# Patient Record
Sex: FEMALE | Race: WHITE | NOT HISPANIC OR LATINO | Employment: UNEMPLOYED | URBAN - METROPOLITAN AREA
[De-identification: names, ages, dates, MRNs, and addresses within clinical notes are randomized per-mention and may not be internally consistent; named-entity substitution may affect disease eponyms.]

---

## 2023-04-04 ENCOUNTER — TELEPHONE (OUTPATIENT)
Dept: OBSTETRICS AND GYNECOLOGY | Facility: CLINIC | Age: 33
End: 2023-04-04
Payer: MEDICAID

## 2023-04-04 DIAGNOSIS — R11.2 NAUSEA AND VOMITING, UNSPECIFIED VOMITING TYPE: Primary | ICD-10-CM

## 2023-04-04 RX ORDER — DIPHENHYDRAMINE HYDROCHLORIDE 25 MG/1
CAPSULE ORAL
Qty: 90 TABLET | Refills: 4 | Status: SHIPPED | OUTPATIENT
Start: 2023-04-04

## 2023-04-04 NOTE — TELEPHONE ENCOUNTER
Patient called this pm.  She is requesting something for nausea.  Patient has appointment on 6/19/23 new ob with Samuel Loja.  Per protocol sent Pyridoxine & Doxylamine.  Called patient left message Rx @ pharmacy.

## 2023-06-15 ENCOUNTER — INITIAL PRENATAL (OUTPATIENT)
Dept: OBSTETRICS AND GYNECOLOGY | Facility: CLINIC | Age: 33
End: 2023-06-15
Payer: MEDICAID

## 2023-06-15 VITALS — SYSTOLIC BLOOD PRESSURE: 131 MMHG | BODY MASS INDEX: 38.17 KG/M2 | DIASTOLIC BLOOD PRESSURE: 87 MMHG | WEIGHT: 202 LBS

## 2023-06-15 DIAGNOSIS — Z34.80 SUPERVISION OF OTHER NORMAL PREGNANCY, ANTEPARTUM: Primary | ICD-10-CM

## 2023-06-15 DIAGNOSIS — O09.30 LATE PRENATAL CARE AFFECTING PREGNANCY, ANTEPARTUM: ICD-10-CM

## 2023-06-15 LAB
ABO GROUP BLD: NORMAL
AMPHET+METHAMPHET UR QL: NEGATIVE
B-HCG UR QL: POSITIVE
BARBITURATES UR QL SCN: NEGATIVE
BASOPHILS # BLD AUTO: 0.03 10*3/MM3 (ref 0–0.2)
BASOPHILS NFR BLD AUTO: 0.2 % (ref 0–1.5)
BENZODIAZ UR QL SCN: NEGATIVE
BLD GP AB SCN SERPL QL: NEGATIVE
CANNABINOIDS SERPL QL: POSITIVE
COCAINE UR QL: NEGATIVE
DEPRECATED RDW RBC AUTO: 41.9 FL (ref 37–54)
EOSINOPHIL # BLD AUTO: 0.09 10*3/MM3 (ref 0–0.4)
EOSINOPHIL NFR BLD AUTO: 0.7 % (ref 0.3–6.2)
ERYTHROCYTE [DISTWIDTH] IN BLOOD BY AUTOMATED COUNT: 13 % (ref 12.3–15.4)
EXPIRATION DATE: ABNORMAL
FENTANYL UR-MCNC: NEGATIVE NG/ML
GLUCOSE UR STRIP-MCNC: NEGATIVE MG/DL
HCT VFR BLD AUTO: 36.3 % (ref 34–46.6)
HCV AB SER DONR QL: REACTIVE
HGB BLD-MCNC: 12.4 G/DL (ref 12–15.9)
HIV1+2 AB SER QL: NORMAL
IMM GRANULOCYTES # BLD AUTO: 0.08 10*3/MM3 (ref 0–0.05)
IMM GRANULOCYTES NFR BLD AUTO: 0.6 % (ref 0–0.5)
INTERNAL NEGATIVE CONTROL: NEGATIVE
INTERNAL POSITIVE CONTROL: ABNORMAL
LYMPHOCYTES # BLD AUTO: 1.88 10*3/MM3 (ref 0.7–3.1)
LYMPHOCYTES NFR BLD AUTO: 14.9 % (ref 19.6–45.3)
Lab: ABNORMAL
MCH RBC QN AUTO: 30.2 PG (ref 26.6–33)
MCHC RBC AUTO-ENTMCNC: 34.2 G/DL (ref 31.5–35.7)
MCV RBC AUTO: 88.3 FL (ref 79–97)
METHADONE UR QL SCN: NEGATIVE
MONOCYTES # BLD AUTO: 0.57 10*3/MM3 (ref 0.1–0.9)
MONOCYTES NFR BLD AUTO: 4.5 % (ref 5–12)
NEUTROPHILS NFR BLD AUTO: 79.1 % (ref 42.7–76)
NEUTROPHILS NFR BLD AUTO: 9.99 10*3/MM3 (ref 1.7–7)
NRBC BLD AUTO-RTO: 0 /100 WBC (ref 0–0.2)
OPIATES UR QL: NEGATIVE
OXYCODONE UR QL SCN: NEGATIVE
PLATELET # BLD AUTO: 272 10*3/MM3 (ref 140–450)
PMV BLD AUTO: 10.3 FL (ref 6–12)
PROT UR STRIP-MCNC: NEGATIVE MG/DL
RBC # BLD AUTO: 4.11 10*6/MM3 (ref 3.77–5.28)
RH BLD: NEGATIVE
T PALLIDUM IGG SER QL: NORMAL
WBC NRBC COR # BLD: 12.64 10*3/MM3 (ref 3.4–10.8)

## 2023-06-15 PROCEDURE — 80307 DRUG TEST PRSMV CHEM ANLYZR: CPT | Performed by: NURSE PRACTITIONER

## 2023-06-15 PROCEDURE — 86850 RBC ANTIBODY SCREEN: CPT | Performed by: NURSE PRACTITIONER

## 2023-06-15 PROCEDURE — 86900 BLOOD TYPING SEROLOGIC ABO: CPT | Performed by: NURSE PRACTITIONER

## 2023-06-15 PROCEDURE — 87086 URINE CULTURE/COLONY COUNT: CPT | Performed by: NURSE PRACTITIONER

## 2023-06-15 PROCEDURE — G0432 EIA HIV-1/HIV-2 SCREEN: HCPCS | Performed by: NURSE PRACTITIONER

## 2023-06-15 PROCEDURE — 86901 BLOOD TYPING SEROLOGIC RH(D): CPT | Performed by: NURSE PRACTITIONER

## 2023-06-15 PROCEDURE — 85025 COMPLETE CBC W/AUTO DIFF WBC: CPT | Performed by: NURSE PRACTITIONER

## 2023-06-15 PROCEDURE — 87340 HEPATITIS B SURFACE AG IA: CPT | Performed by: NURSE PRACTITIONER

## 2023-06-15 PROCEDURE — 86780 TREPONEMA PALLIDUM: CPT | Performed by: NURSE PRACTITIONER

## 2023-06-15 PROCEDURE — 86803 HEPATITIS C AB TEST: CPT | Performed by: NURSE PRACTITIONER

## 2023-06-15 PROCEDURE — 80053 COMPREHEN METABOLIC PANEL: CPT | Performed by: NURSE PRACTITIONER

## 2023-06-15 RX ORDER — MECLIZINE HYDROCHLORIDE 25 MG/1
25 TABLET ORAL DAILY
COMMUNITY

## 2023-06-15 NOTE — PROGRESS NOTES
OB Initial Visit    CC- Here for care of current pregnancy, first visit    Subjective:  32 y.o.  presenting for her first obstetrical visit.    LMP: No LMP recorded (lmp unknown). Patient is pregnant.     Pt has no complaints, is doing well    Patient is upset she is not having an ultrasound today, explained will need to schedule anatomy ultrasound.     Reviewed and updated:  OBHx, GYNHx (STDs), PMHx, Medications, Allergies, PSHx, Social Hx, Preventative Hx (PAP), Hx of abuse/safe environment, Vaccine Hx including hx of chickenpox or vaccine, Genetic Hx (pt, FOB, both families).        Objective:  /87   Wt 91.6 kg (202 lb)   LMP  (LMP Unknown)   BMI 38.17 kg/m²      Urine pregnancy test is positive     General- NAD, alert and oriented, appropriate, constricted pupils noted  Psych- Normal mood, good memory  Neck- No masses, no thyroid enlargement  CV- Regular rhythm, no murnurs  Resp- CTA to bases, no wheezes  Abdomen- Soft, non distended, non tender, no masses    Breast left- deferred  Breast right- deferred    Uterus- Normal shape and consistency, non tender, Size greater than dates,  .    Patient refused pelvic exam    Lymphatic- No palpable neck, axillary, or groin nodes  Ext- No edema, no cyanosis    Skin- No lesions, no rashes, no acanthosis nigricans    Assessment and Plan:  Unknown  Diagnoses and all orders for this visit:    1. Supervision of other normal pregnancy, antepartum (Primary)  Overview:  GLORY finalized: Unsure LMP, believes she conceived on Gan's day    Genetic testing (NIPS-Quad)/CF/AFP:  Discussed all, declines    COVID: Fully vaccinated  Flu:  Tdap:    Rhogam:  ? Desires Sterilization:    Anatomy US:  FU US:    PROBLEM LIST/PLAN:   Late care - UDS        Orders:  -     POC Urinalysis Dipstick  -     Cancel: IGP,CtNgTv,Apt HPV,rfx 16 / 18,45  -     OB Panel With HIV  -     Urine Culture - Urine, Urine, Random Void  -     Hemoglobinopathy Fractionation Cascade  -      POC Pregnancy, Urine  -     US Ob Detail Fetal Anatomy Single or First Gestation; Future  -     Cancel: Chlamydia trachomatis, Neisseria gonorrhoeae, Trichomonas vaginalis, PCR - Swab, Cervix  -     Chlamydia trachomatis, Neisseria gonorrhoeae, Trichomonas vaginalis, PCR - Urine, Urine, Clean Catch    2. Late prenatal care affecting pregnancy, antepartum  Overview:  UDS    Orders:  -     Urine Drug Screen - Urine, Clean Catch        Genetic Screening:   Counseled.  Declines all.     Vaccines:   s/p COVID vaccine    Counseling:   Nutrition discussed, calories, activity/exercise in pregnancy  Discussed dietary restrictions/safety food preparation in pregnancy  Reviewed what to expect prenatal visits, office providers and formerly [facility] hospitalists  Appropriate trimester precautions provided, N/V, vag bleeding, cramping  Questions answered    Labs:   Prenatal labs, cultures, and PAP performed (prn)    Return in about 5 weeks (around 7/20/2023) for [facility] Office, OB follow up, 1hGTT.      Samuel Garner, APRN  06/15/2023    [facility] [place] [address]  [facility]  [address] [place] [address]  Dept: [phone]  Loc: [phone]

## 2023-06-16 DIAGNOSIS — B18.2 CHRONIC HEPATITIS C COMPLICATING PREGNANCY, ANTEPARTUM: Primary | ICD-10-CM

## 2023-06-16 DIAGNOSIS — O98.419 CHRONIC HEPATITIS C COMPLICATING PREGNANCY, ANTEPARTUM: Primary | ICD-10-CM

## 2023-06-16 LAB
ALBUMIN SERPL-MCNC: 3.6 G/DL (ref 3.5–5.2)
ALBUMIN/GLOB SERPL: 1.2 G/DL
ALP SERPL-CCNC: 55 U/L (ref 39–117)
ALT SERPL W P-5'-P-CCNC: 58 U/L (ref 1–33)
ANION GAP SERPL CALCULATED.3IONS-SCNC: 17 MMOL/L (ref 5–15)
AST SERPL-CCNC: 53 U/L (ref 1–32)
BACTERIA SPEC AEROBE CULT: NO GROWTH
BILIRUB SERPL-MCNC: 0.3 MG/DL (ref 0–1.2)
BUN SERPL-MCNC: 6 MG/DL (ref 6–20)
BUN/CREAT SERPL: 9 (ref 7–25)
CALCIUM SPEC-SCNC: 9.4 MG/DL (ref 8.6–10.5)
CHLORIDE SERPL-SCNC: 103 MMOL/L (ref 98–107)
CO2 SERPL-SCNC: 16 MMOL/L (ref 22–29)
CREAT SERPL-MCNC: 0.67 MG/DL (ref 0.57–1)
EGFRCR SERPLBLD CKD-EPI 2021: 120.8 ML/MIN/1.73
GLOBULIN UR ELPH-MCNC: 3.1 GM/DL
GLUCOSE SERPL-MCNC: 91 MG/DL (ref 65–99)
HBV SURFACE AG SERPL QL IA: NORMAL
HGB A MFR BLD ELPH: 97.2 % (ref 96.4–98.8)
HGB A2 MFR BLD ELPH: 2.8 % (ref 1.8–3.2)
HGB F MFR BLD ELPH: 0 % (ref 0–2)
HGB FRACT BLD-IMP: NORMAL
HGB S MFR BLD ELPH: 0 %
POTASSIUM SERPL-SCNC: 3.9 MMOL/L (ref 3.5–5.2)
PROT SERPL-MCNC: 6.7 G/DL (ref 6–8.5)
RUBV IGG SERPL IA-ACNC: 1.86 INDEX
SODIUM SERPL-SCNC: 136 MMOL/L (ref 136–145)

## 2023-06-17 LAB
C TRACH RRNA SPEC QL NAA+PROBE: NEGATIVE
N GONORRHOEA RRNA SPEC QL NAA+PROBE: NEGATIVE
T VAGINALIS RRNA SPEC QL NAA+PROBE: NEGATIVE

## 2023-07-18 PROBLEM — O26.899 RH NEGATIVE, ANTEPARTUM: Status: ACTIVE | Noted: 2023-07-18

## 2023-07-18 PROBLEM — Z67.91 RH NEGATIVE, ANTEPARTUM: Status: ACTIVE | Noted: 2023-07-18

## 2023-07-20 PROCEDURE — 85027 COMPLETE CBC AUTOMATED: CPT | Performed by: OBSTETRICS & GYNECOLOGY

## 2023-07-20 PROCEDURE — 82950 GLUCOSE TEST: CPT | Performed by: OBSTETRICS & GYNECOLOGY

## 2023-07-28 ENCOUNTER — HOSPITAL ENCOUNTER (OUTPATIENT)
Dept: INFUSION THERAPY | Facility: HOSPITAL | Age: 33
Discharge: HOME | End: 2023-07-28
Payer: MEDICAID

## 2023-07-28 VITALS
OXYGEN SATURATION: 99 % | HEART RATE: 117 BPM | DIASTOLIC BLOOD PRESSURE: 80 MMHG | TEMPERATURE: 96.2 F | SYSTOLIC BLOOD PRESSURE: 119 MMHG | RESPIRATION RATE: 20 BRPM

## 2023-07-28 DIAGNOSIS — Z67.91 RH NEGATIVE, ANTEPARTUM: Primary | ICD-10-CM

## 2023-07-28 DIAGNOSIS — O26.899 RH NEGATIVE, ANTEPARTUM: Primary | ICD-10-CM

## 2023-07-28 DIAGNOSIS — B18.2 CHRONIC HEPATITIS C COMPLICATING PREGNANCY, ANTEPARTUM: ICD-10-CM

## 2023-07-28 DIAGNOSIS — O98.419 CHRONIC HEPATITIS C COMPLICATING PREGNANCY, ANTEPARTUM: ICD-10-CM

## 2023-07-28 LAB
ABO GROUP BLD: NORMAL
RH BLD: NEGATIVE

## 2023-07-28 PROCEDURE — 86900 BLOOD TYPING SEROLOGIC ABO: CPT | Performed by: OBSTETRICS & GYNECOLOGY

## 2023-07-28 PROCEDURE — 25010000002 RHO D IMMUNE GLOBULIN 1500 UNIT/2ML SOLUTION PREFILLED SYRINGE: Performed by: OBSTETRICS & GYNECOLOGY

## 2023-07-28 PROCEDURE — 36415 COLL VENOUS BLD VENIPUNCTURE: CPT

## 2023-07-28 PROCEDURE — 86901 BLOOD TYPING SEROLOGIC RH(D): CPT | Performed by: OBSTETRICS & GYNECOLOGY

## 2023-07-28 PROCEDURE — 96372 THER/PROPH/DIAG INJ SC/IM: CPT

## 2023-07-28 PROCEDURE — 87522 HEPATITIS C REVRS TRNSCRPJ: CPT | Performed by: NURSE PRACTITIONER

## 2023-07-28 RX ORDER — MECLIZINE HYDROCHLORIDE 25 MG/1
25 TABLET ORAL 3 TIMES DAILY PRN
COMMUNITY

## 2023-07-28 RX ORDER — PRENATAL VIT NO.126/IRON/FOLIC 28MG-0.8MG
TABLET ORAL DAILY
COMMUNITY

## 2023-07-28 RX ADMIN — HUMAN RHO(D) IMMUNE GLOBULIN 1500 UNITS: 1500 SOLUTION INTRAMUSCULAR; INTRAVENOUS at 13:41

## 2023-07-28 NOTE — PROGRESS NOTES
1420-Patient observed 30 minutes post injection. No S/S reaction noted.  Patient D/C'd ambulatory with significant other.

## 2023-07-31 LAB
HCV RNA SERPL NAA+PROBE-ACNC: NORMAL IU/ML
HCV RNA SERPL NAA+PROBE-LOG IU: 6.23 LOG10 IU/ML
TEST INFORMATION: NORMAL

## 2023-08-15 ENCOUNTER — HOSPITAL ENCOUNTER (EMERGENCY)
Facility: HOSPITAL | Age: 33
Discharge: HOME | End: 2023-08-15
Attending: OBSTETRICS & GYNECOLOGY | Admitting: OBSTETRICS & GYNECOLOGY
Payer: MEDICAID

## 2023-08-15 VITALS
BODY MASS INDEX: 36.8 KG/M2 | DIASTOLIC BLOOD PRESSURE: 56 MMHG | WEIGHT: 200 LBS | TEMPERATURE: 99 F | HEIGHT: 62 IN | HEART RATE: 98 BPM | RESPIRATION RATE: 18 BRPM | OXYGEN SATURATION: 97 % | SYSTOLIC BLOOD PRESSURE: 130 MMHG

## 2023-08-15 LAB — A1 MICROGLOB PLACENTAL VAG QL: NEGATIVE

## 2023-08-15 PROCEDURE — 99284 EMERGENCY DEPT VISIT MOD MDM: CPT | Performed by: OBSTETRICS & GYNECOLOGY

## 2023-08-15 PROCEDURE — 59025 FETAL NON-STRESS TEST: CPT

## 2023-08-15 PROCEDURE — 84112 EVAL AMNIOTIC FLUID PROTEIN: CPT | Performed by: OBSTETRICS & GYNECOLOGY

## 2023-08-15 NOTE — OBED NOTES
NILSA Note OB        Patient Name: Hortencia Canada  YOB: 1990  MRN: [id number]  Admission Date: 8/15/2023  1:30 AM  Date of Service: 8/15/2023    Chief Complaint: Leaking Fluid (Pt came into theobed c/o of lof . Pt states that she was sitting on a toilet and straining to have a bm for several mins and then she felt fluid come out. + fm noted denies any vag bleeding denies any ctx )        Subjective     Hortencia Canada is a 33 y.o. female  at 29w4d with Estimated Date of Delivery: 10/27/23 who presents with the chief complaint listed above.  She sees Crystal Orellana MD for her prenatal care. Her pregnancy has been complicated by:   chronic hepatitis C, THC use, Rh negative .        She describes fetal movement as normal.  She admits to rupture of membranes.  She denies vaginal bleeding. She is not feeling contractions.          Objective   Patient Active Problem List    Diagnosis     Rh negative, antepartum [O26.899, Z67.91]     Chronic hepatitis C complicating pregnancy, antepartum [O98.419, B18.2]     Supervision of other normal pregnancy, antepartum [Z34.80]     Late prenatal care affecting pregnancy, antepartum [O09.30]         OB History    Para Term  AB Living   2 1 1 0 0 1   SAB IAB Ectopic Molar Multiple Live Births   0 0 0 0 0 1      # Outcome Date GA Lbr Juan Miguel/2nd Weight Sex Delivery Anes PTL Lv   2 Current            1 Term 17 41w1d  3317 g (7 lb 5 oz) M Vag-Spont  N LUL        Past Medical History:   Diagnosis Date    Anxiety     Depression     Hepatitis C        Past Surgical History:   Procedure Laterality Date    TONSILLECTOMY         No current facility-administered medications on file prior to encounter.     Current Outpatient Medications on File Prior to Encounter   Medication Sig Dispense Refill    meclizine (ANTIVERT) 25 MG tablet Take 1 tablet by mouth Daily.      prenatal vitamin (prenatal, CLASSIC, vitamin) tablet Take  by mouth Daily.      meclizine  "(Dramamine) 25 MG tablet Take 1 tablet by mouth 3 (Three) Times a Day As Needed for Dizziness.         No Known Allergies    Family History   Problem Relation Age of Onset    Diabetes Mother     Leukemia Maternal Grandfather     Hypertension Neg Hx        Social History     Socioeconomic History    Marital status: Single   Tobacco Use    Smoking status: Former     Packs/day: 0.50     Types: Cigarettes    Tobacco comments:     Quit smoking around 2017   Vaping Use    Vaping Use: Never used   Substance and Sexual Activity    Alcohol use: No    Drug use: Not Currently    Sexual activity: Yes     Partners: Male     Birth control/protection: None           Review of Systems   Constitutional:  Negative for chills, fatigue and fever.   HENT:  Negative for congestion, rhinorrhea and sore throat.    Eyes:  Negative for visual disturbance.   Respiratory: Negative.     Cardiovascular: Negative.    Gastrointestinal:  Positive for constipation. Negative for abdominal pain, diarrhea, nausea and vomiting.   Genitourinary:  Positive for vaginal discharge. Negative for difficulty urinating, dyspareunia, dysuria, flank pain, frequency, genital sores, hematuria, pelvic pain, urgency, vaginal bleeding and vaginal pain.   Neurological:  Negative for dizziness, seizures, light-headedness and headaches.   Psychiatric/Behavioral:  Negative for sleep disturbance. The patient is not nervous/anxious.         PHYSICAL EXAM:      VITAL SIGNS:  Vitals:    08/15/23 0155   BP: 130/56   BP Location: Right arm   Patient Position: Sitting   Pulse: 98   Resp: 18   Temp: 99 øF (37.2 øC)   TempSrc: Oral   SpO2: 97%   Weight: 90.7 kg (200 lb)   Height: 157.5 cm (62\")        FHT'S:                   Baseline:  145 BPM  Variability:  Moderate = 6 - 25 BPM  Accelerations:  15 x 15 accelerations present     Decelerations:  absent  Contractions:   absent     Interpretation:    Reactive NST, CAT 1 tracing        PHYSICAL EXAM:    General: well developed; well " "nourished  no acute distress   Heart: Not performed.   Lungs  : breathing is unlabored     Abdomen: soft, non-tender; no masses  no umbilical or inguinal hernias are present  no hepato-splenomegaly       Cervix: was checked (by RN): 0 cm / 1 % / -3  Cervical Dilation (cm): 0  Cervical Consistency: firm  Cervical Position: posterior   Contractions: none        Extremities: peripheral pulses normal, no pedal edema, no clubbing or cyanosis      LABS AND TESTING ORDERED:  Uterine and fetal monitoring  Urinalysis  ROM plus    LAB RESULTS:    No results found for this or any previous visit (from the past 24 hour(s)).    Lab Results   Component Value Date    ABO A 2023    RH Negative 2023       No results found for: STREPGPB              Assessment & Plan     ASSESSMENT/PLAN:  Hortencia Canada is a 33 y.o. female  at 29w4d who presented with: vaginal discharge after bowel movement.  There is no evidence of PPROM on exam and ROM plus is negative.  Her cervix is closed.  She was reassured she is not leaking amniotic fluid.  She requested an enema for her constipation and was given one to use at home.  She was encouraged to use daily bowel regimen to prevent further constipation.  She was discharged home.          Final Impression:  Pregnancy at 29w4d  Reactive NST.  CAT 1 tracing  PPROM ruled out  Maternal vital signs were reviewed and were unremarkable              Vitals:    08/15/23 0155   BP: 130/56   BP Location: Right arm   Patient Position: Sitting   Pulse: 98   Resp: 18   Temp: 99 øF (37.2 øC)   TempSrc: Oral   SpO2: 97%   Weight: 90.7 kg (200 lb)   Height: 157.5 cm (62\")       No results found for: STREPGPB  Lab Results   Component Value Date    ABO A 2023    RH Negative 2023     COVID - 19 status unknown      PLAN:       I have spent 30 minutes including face to face time with the patient, greater than 50% in discussion of the diagnosis (counseling) and/or coordination of care.     Crystal RHODES" MD Esteban  8/15/2023  02:12 EDT  OB Hospitalist  Phone:  [phone]

## 2023-08-18 ENCOUNTER — ROUTINE PRENATAL (OUTPATIENT)
Dept: OBSTETRICS AND GYNECOLOGY | Facility: CLINIC | Age: 33
End: 2023-08-18
Payer: MEDICAID

## 2023-08-18 VITALS — DIASTOLIC BLOOD PRESSURE: 80 MMHG | WEIGHT: 203 LBS | SYSTOLIC BLOOD PRESSURE: 124 MMHG | BODY MASS INDEX: 37.13 KG/M2

## 2023-08-18 DIAGNOSIS — Z67.91 RH NEGATIVE, ANTEPARTUM: ICD-10-CM

## 2023-08-18 DIAGNOSIS — O26.899 RH NEGATIVE, ANTEPARTUM: ICD-10-CM

## 2023-08-18 DIAGNOSIS — Z34.80 SUPERVISION OF OTHER NORMAL PREGNANCY, ANTEPARTUM: Primary | ICD-10-CM

## 2023-08-18 LAB
GLUCOSE UR STRIP-MCNC: NEGATIVE MG/DL
PROT UR STRIP-MCNC: NEGATIVE MG/DL

## 2023-08-18 NOTE — PROGRESS NOTES
OB FOLLOW UP  CC- Here for care of pregnancy        Hortencia Canada is a 33 y.o.  30w0d patient being seen today for her obstetrical follow up visit. Patient reports no complaints.     Her prenatal care is complicated by (and status) :    Patient Active Problem List   Diagnosis    Supervision of other normal pregnancy, antepartum    Late prenatal care affecting pregnancy, antepartum    Chronic hepatitis C complicating pregnancy, antepartum    Rh negative, antepartum       Flu Status:   Covid Status:    ROS -   Patient Reports : No Problems  Patient Denies: Loss of Fluid and Vaginal Spotting  Fetal Movement : normal  All other systems reviewed and are negative.       The additional following portions of the patient's history were reviewed and updated as appropriate: allergies, current medications, past family history, past medical history, past social history, past surgical history, and problem list.    I have reviewed and agree with the HPI, ROS, and historical information as entered above. Cristin Nielson, DO    /80   Wt 92.1 kg (203 lb)   LMP  (LMP Unknown)   BMI 37.13 kg/mý       EXAM:     FHT: 132 BPM   Uterine Size:  31 cm  Pelvic Exam: No    Urine glucose/protein: See prenatal flowsheet       Assessment and Plan  Diagnoses and all orders for this visit:    1. Supervision of other normal pregnancy, antepartum (Primary)  Overview:  GLORY finalized: 10/27/23 per anatomy scan    Genetic testing (NIPS-Quad)/CF/AFP:  Discussed all, declines    COVID: Fully vaccinated  Flu:  Tdap:    Rhogam:  ? Desires Sterilization:    Anatomy US: 23 normal anatomy, posterior placenta,  grams, 74%, AC 56%  FU US:    PROBLEM LIST/PLAN:   Late care - UDS  Hepatitis C - 6/15/23 liver enzymes elevated        Orders:  -     POC Urinalysis Dipstick    2. Rh negative, antepartum  Assessment & Plan:  Received rhogam           Pregnancy at 30w0d  Fetal status reassuring.   Activity and Exercise discussed.  Return in  about 2 weeks (around 9/1/2023) for rotate providers.  Kick counts shyam Nielson DO  08/18/2023

## 2023-09-01 ENCOUNTER — ROUTINE PRENATAL (OUTPATIENT)
Dept: OBSTETRICS AND GYNECOLOGY | Facility: CLINIC | Age: 33
End: 2023-09-01
Payer: MEDICAID

## 2023-09-01 ENCOUNTER — TELEPHONE (OUTPATIENT)
Dept: OBSTETRICS AND GYNECOLOGY | Facility: CLINIC | Age: 33
End: 2023-09-01

## 2023-09-01 VITALS — SYSTOLIC BLOOD PRESSURE: 112 MMHG | BODY MASS INDEX: 37.13 KG/M2 | DIASTOLIC BLOOD PRESSURE: 77 MMHG | WEIGHT: 203 LBS

## 2023-09-01 DIAGNOSIS — F12.10 MILD TETRAHYDROCANNABINOL (THC) ABUSE: ICD-10-CM

## 2023-09-01 DIAGNOSIS — Z34.80 SUPERVISION OF OTHER NORMAL PREGNANCY, ANTEPARTUM: Primary | ICD-10-CM

## 2023-09-01 DIAGNOSIS — O98.419 CHRONIC HEPATITIS C COMPLICATING PREGNANCY, ANTEPARTUM: ICD-10-CM

## 2023-09-01 DIAGNOSIS — O26.899 RH NEGATIVE, ANTEPARTUM: ICD-10-CM

## 2023-09-01 DIAGNOSIS — Z67.91 RH NEGATIVE, ANTEPARTUM: ICD-10-CM

## 2023-09-01 DIAGNOSIS — B18.2 CHRONIC HEPATITIS C COMPLICATING PREGNANCY, ANTEPARTUM: ICD-10-CM

## 2023-09-01 DIAGNOSIS — O09.30 LATE PRENATAL CARE AFFECTING PREGNANCY, ANTEPARTUM: ICD-10-CM

## 2023-09-01 LAB
AMPHET+METHAMPHET UR QL: NEGATIVE
BARBITURATES UR QL SCN: NEGATIVE
BENZODIAZ UR QL SCN: NEGATIVE
CANNABINOIDS SERPL QL: POSITIVE
COCAINE UR QL: NEGATIVE
FENTANYL UR-MCNC: NEGATIVE NG/ML
GLUCOSE UR STRIP-MCNC: NEGATIVE MG/DL
METHADONE UR QL SCN: NEGATIVE
OPIATES UR QL: NEGATIVE
OXYCODONE UR QL SCN: NEGATIVE
PROT UR STRIP-MCNC: NEGATIVE MG/DL

## 2023-09-01 PROCEDURE — 80307 DRUG TEST PRSMV CHEM ANLYZR: CPT | Performed by: STUDENT IN AN ORGANIZED HEALTH CARE EDUCATION/TRAINING PROGRAM

## 2023-09-01 PROCEDURE — 80076 HEPATIC FUNCTION PANEL: CPT | Performed by: STUDENT IN AN ORGANIZED HEALTH CARE EDUCATION/TRAINING PROGRAM

## 2023-09-01 NOTE — PATIENT INSTRUCTIONS
Venipuncture Blood Specimen Collection  Venipuncture performed in left arm by Chaya Sharma with good hemostasis. Patient tolerated the procedure well without complications.   09/01/23   Chaya Sharma

## 2023-09-01 NOTE — PROGRESS NOTES
OB FOLLOW UP  Complaint   Chief Complaint   Patient presents with    Routine Prenatal Visit            Hortencia Canada is a 33 y.o.  32w0d patient being seen today for her obstetrical follow up visit. Patient denies decreased fetal movement, contractions, loss of fluid or vaginal bleeding.  Patient reports no treatment for hepatitis C found out in 2016.  History of IV drug use.  Denies use in pregnancy.  Completed RhoGAM in third trimester.  Does have Tdap prescription has not completed Tdap administration yet.    Her prenatal care is complicated by (and status) :    Patient Active Problem List   Diagnosis    Supervision of other normal pregnancy, antepartum    Late prenatal care affecting pregnancy, antepartum    Chronic hepatitis C complicating pregnancy, antepartum    Rh negative, antepartum    Mild tetrahydrocannabinol (THC) abuse       All other systems reviewed and are negative.     The additional following portions of the patient's history were reviewed and updated as appropriate: allergies, current medications, past family history, past medical history, past social history, past surgical history, and problem list.      EXAM:     Vital signs: /77   Wt 92.1 kg (203 lb)   LMP  (LMP Unknown)   BMI 37.13 kg/mý   Appearance/psychiatric: To be in no distress  Constitutional: The patient is well nourished.  Cardiovascular: She does not have edema.  Respiratory: Respiratory effort is normal.  Gastrointestinal: Abdomen is soft, gravid, nontender, no rashes, heart tones are present, fundal height is size equals dates    Pelvic Exam: No    Urine glucose/protein: See prenatal flowsheet       Assessment and Plan    Problem List Items Addressed This Visit          Gravid and     Chronic hepatitis C complicating pregnancy, antepartum    Overview     6/15/23 - elevated liver enzymes noted  2023 repeat LFTs and Hep C quant          Late prenatal care affecting pregnancy, antepartum    Overview      9/1/2023 Dating by 21 week U/S          Rh negative, antepartum    Overview     7/28/2023 - Rhogam administered          Supervision of other normal pregnancy, antepartum - Primary    Overview     GLORY finalized: 10/27/23 per anatomy scan    Genetic testing (NIPS-Quad)/CF/AFP:  Discussed all, declines    COVID: Fully vaccinated  Flu: 9/1/2023 recommended when available   Tdap: 9/1/2023 Rx provided     Rhogam:  ? Desires Sterilization:    Anatomy US: 6/22/23 normal anatomy, posterior placenta,  grams, 74%, AC 56%  FU US:    PROBLEM LIST/PLAN:   Late care - UDS  Hepatitis C - 6/15/23 liver enzymes elevated             Relevant Orders    POC Urinalysis Dipstick (Completed)       Mental Health    Mild tetrahydrocannabinol (THC) abuse    Overview     9/1/2023 + on NOB labs             Impression  Pregnancy at 32w0d  Fetal status reassuring.   Activity and Exercise discussed.    Plan  Chronic hepatitis C -liver function testing and quantitative value today  Begin nonstress test at 34 weeks gestational age  Follow-up growth ultrasound secondary to hepatitis C  Return to office in 2 weeks, recommendation for administration of Tdap      Patient was counseled to the following pregnancy precautions:  Decreased fetal movement, if concern for decreased fetal movement please perform fetal kick counts you are looking for 10 movements in 2 hours.  If concern for fetal movement and not meeting that criteria, please present to triage for evaluation.  Contractions occurring every 5 minutes for over an hour, lasting 30 to 60 seconds and progressively causing more discomfort, please seek medical attention to rule out labor  If you believe that your water is broken, place a sanitary pad.  If pad fills in short period of time i.e. less than 5 minutes, take off pad placed another pad.  If this is saturated please present for rule out rupture of membranes  Vaginal bleeding can be normal in pregnancy, this usually takes a form of  spotting.  If having heavier bleeding like a menstrual period please present for evaluation; especially in light of severe abdominal pain this could represent a placental abruption.  Keep all scheduled appointments as recommended.        Nik Norton MD  09/01/2023

## 2023-09-02 LAB
ALBUMIN SERPL-MCNC: 3.3 G/DL (ref 3.5–5.2)
ALP SERPL-CCNC: 87 U/L (ref 39–117)
ALT SERPL W P-5'-P-CCNC: 25 U/L (ref 1–33)
AST SERPL-CCNC: 32 U/L (ref 1–32)
BILIRUB CONJ SERPL-MCNC: <0.2 MG/DL (ref 0–0.3)
BILIRUB INDIRECT SERPL-MCNC: ABNORMAL MG/DL
BILIRUB SERPL-MCNC: 0.3 MG/DL (ref 0–1.2)
PROT SERPL-MCNC: 7 G/DL (ref 6–8.5)

## 2023-09-07 LAB
HCV RNA SERPL NAA+PROBE-ACNC: NORMAL IU/ML
HCV RNA SERPL NAA+PROBE-LOG IU: 6.36 LOG10 IU/ML
TEST INFORMATION: NORMAL

## 2023-09-15 ENCOUNTER — HOSPITAL ENCOUNTER (OUTPATIENT)
Facility: HOSPITAL | Age: 33
Discharge: HOME | End: 2023-09-15
Attending: STUDENT IN AN ORGANIZED HEALTH CARE EDUCATION/TRAINING PROGRAM | Admitting: STUDENT IN AN ORGANIZED HEALTH CARE EDUCATION/TRAINING PROGRAM
Payer: MEDICAID

## 2023-09-15 VITALS — SYSTOLIC BLOOD PRESSURE: 136 MMHG | HEART RATE: 101 BPM | DIASTOLIC BLOOD PRESSURE: 85 MMHG | RESPIRATION RATE: 16 BRPM

## 2023-09-15 PROCEDURE — 59025 FETAL NON-STRESS TEST: CPT

## 2023-09-15 PROCEDURE — 59025 FETAL NON-STRESS TEST: CPT | Performed by: STUDENT IN AN ORGANIZED HEALTH CARE EDUCATION/TRAINING PROGRAM

## 2023-09-15 NOTE — NON STRESS TEST
Obstetrical Non-stress Test Interpretation     Name:  Hortencia Canada  MRN: [id number]    33 y.o. female  at 34w0d    Indication: Chronic Hepatitis C.       Fetal Assessment  Fetal Movement: active  Fetal HR Assessment Method: external  Fetal HR (beats/min): 140  Fetal HR Baseline: normal range  Fetal HR Variability: moderate (amplitude range 6 to 25 bpm)  Fetal HR Accelerations: greater than/equal to 15 bpm, lasting at least 15 seconds  Fetal HR Decelerations: absent  Sinusoidal Pattern Present: absent  Fetal HR Tracing Category: Category I    /85 (BP Location: Right arm, Patient Position: Sitting)   Pulse 101   Resp 16   LMP  (LMP Unknown)     Reason for test: Chronic Hep C.   Date of Test: 9/15/2023  Time frame of test: 0659-2556  RN NST Interpretation: Reactive      Josselin Martínez RN  9/15/2023  12:15 EDT

## 2023-09-21 ENCOUNTER — ROUTINE PRENATAL (OUTPATIENT)
Dept: OBSTETRICS AND GYNECOLOGY | Facility: CLINIC | Age: 33
End: 2023-09-21
Payer: MEDICAID

## 2023-09-21 ENCOUNTER — HOSPITAL ENCOUNTER (OUTPATIENT)
Facility: HOSPITAL | Age: 33
Discharge: HOME | End: 2023-09-21
Attending: STUDENT IN AN ORGANIZED HEALTH CARE EDUCATION/TRAINING PROGRAM | Admitting: STUDENT IN AN ORGANIZED HEALTH CARE EDUCATION/TRAINING PROGRAM
Payer: MEDICAID

## 2023-09-21 ENCOUNTER — HOSPITAL ENCOUNTER (OUTPATIENT)
Dept: LABOR AND DELIVERY | Facility: HOSPITAL | Age: 33
Discharge: HOME | End: 2023-09-21
Payer: MEDICAID

## 2023-09-21 VITALS — BODY MASS INDEX: 37.86 KG/M2 | SYSTOLIC BLOOD PRESSURE: 119 MMHG | WEIGHT: 207 LBS | DIASTOLIC BLOOD PRESSURE: 86 MMHG

## 2023-09-21 VITALS — OXYGEN SATURATION: 98 % | DIASTOLIC BLOOD PRESSURE: 63 MMHG | SYSTOLIC BLOOD PRESSURE: 118 MMHG | HEART RATE: 100 BPM

## 2023-09-21 DIAGNOSIS — O98.419 CHRONIC HEPATITIS C COMPLICATING PREGNANCY, ANTEPARTUM: ICD-10-CM

## 2023-09-21 DIAGNOSIS — Z67.91 RH NEGATIVE, ANTEPARTUM: ICD-10-CM

## 2023-09-21 DIAGNOSIS — Z34.80 SUPERVISION OF OTHER NORMAL PREGNANCY, ANTEPARTUM: Primary | ICD-10-CM

## 2023-09-21 DIAGNOSIS — B18.2 CHRONIC HEPATITIS C COMPLICATING PREGNANCY, ANTEPARTUM: ICD-10-CM

## 2023-09-21 DIAGNOSIS — F12.10 MILD TETRAHYDROCANNABINOL (THC) ABUSE: ICD-10-CM

## 2023-09-21 DIAGNOSIS — O26.899 RH NEGATIVE, ANTEPARTUM: ICD-10-CM

## 2023-09-21 DIAGNOSIS — O09.30 LATE PRENATAL CARE AFFECTING PREGNANCY, ANTEPARTUM: ICD-10-CM

## 2023-09-21 LAB
GLUCOSE UR STRIP-MCNC: NEGATIVE MG/DL
PROT UR STRIP-MCNC: NEGATIVE MG/DL

## 2023-09-21 PROCEDURE — 59025 FETAL NON-STRESS TEST: CPT

## 2023-09-21 PROCEDURE — 59025 FETAL NON-STRESS TEST: CPT | Performed by: STUDENT IN AN ORGANIZED HEALTH CARE EDUCATION/TRAINING PROGRAM

## 2023-09-21 NOTE — NON STRESS TEST
Obstetrical Non-stress Test Interpretation     Name:  Hortencia Canada  MRN: [id number]    33 y.o. female  at 34w6d    Indication: NST chronic Hep C      Fetal Assessment  Fetal Movement: active  Fetal HR Assessment Method: external  Fetal HR (beats/min): 135  Fetal HR Baseline: normal range  Fetal HR Variability: moderate (amplitude range 6 to 25 bpm)  Fetal HR Accelerations: greater than/equal to 15 bpm, lasting at least 15 seconds  Fetal HR Decelerations: absent  Sinusoidal Pattern Present: absent    /63   Pulse 100   LMP  (LMP Unknown)   SpO2 98%     Reason for test: Other (Comment) (NST Chronic Hep C)  Date of Test: 2023  Time frame of test: 4949-5059  RN NST Interpretation: Macho Bahena RN  2023  13:14 EDT

## 2023-09-21 NOTE — PROGRESS NOTES
OB FOLLOW UP  Complaint   Chief Complaint   Patient presents with    Routine Prenatal Visit            Hortencia Canada is a 33 y.o.  34w6d patient being seen today for her obstetrical follow up visit. Patient denies decreased fetal movement, contractions, loss of fluid or vaginal bleeding.  No acute complaints.  Patient reports she lives in the [place] and concerned about possibility of labor or water breaking while in [place].     Her prenatal care is complicated by (and status) :    Patient Active Problem List   Diagnosis    Supervision of other normal pregnancy, antepartum    Late prenatal care affecting pregnancy, antepartum    Chronic hepatitis C complicating pregnancy, antepartum    Rh negative, antepartum    Mild tetrahydrocannabinol (THC) abuse       All other systems reviewed and are negative.     The additional following portions of the patient's history were reviewed and updated as appropriate: allergies, current medications, past family history, past medical history, past social history, past surgical history, and problem list.      EXAM:     Vital signs: /86   Wt 93.9 kg (207 lb)   LMP  (LMP Unknown)   BMI 37.86 kg/m²   Appearance/psychiatric: To be in no distress  Constitutional: The patient is well nourished.  Cardiovascular: She does not have edema.  Respiratory: Respiratory effort is normal.  Gastrointestinal: Abdomen is soft, gravid, nontender, no rashes, heart tones are present, fundal height is size equals dates    Pelvic Exam: No    Urine glucose/protein: See prenatal flowsheet       Assessment and Plan    Problem List Items Addressed This Visit          Gravid and     Chronic hepatitis C complicating pregnancy, antepartum    Overview     6/15/23 - elevated liver enzymes noted  2023 repeat LFTs and Hep C quant          Late prenatal care affecting pregnancy, antepartum    Overview     2023 Dating by 21 week U/S          Rh negative, antepartum    Overview      7/28/2023 - Rhogam administered          Supervision of other normal pregnancy, antepartum - Primary    Overview     GLORY finalized: 10/27/23 per anatomy scan    Genetic testing (NIPS-Quad)/CF/AFP:  Discussed all, declines    COVID: Fully vaccinated  Flu: 9/1/2023 recommended when available   Tdap: 9/1/2023 Rx provided     Rhogam: 9/1/2023 completed     Anatomy US: 6/22/23 normal anatomy, posterior placenta,  grams, 74%, AC 56%  FU US: 9/21/2023 EFW (32), 5 pounds 5 ounces.  AC (11).  Vertex presentation posterior placenta grade 2.  BPP 8/8    PROBLEM LIST/PLAN:   Late care - UDS  Hepatitis C - 6/15/23 liver enzymes elevated             Relevant Orders    POC Urinalysis Dipstick (Completed)       Mental Health    Mild tetrahydrocannabinol (THC) abuse    Overview     9/1/2023 + on NOB labs             Impression  Pregnancy at 34w6d  Fetal status reassuring.   Activity and Exercise discussed.    Plan  Review of follow-up ultrasound with appropriate fetal growth.  Biophysical profile 8 out of 8.  Chronic hepatitis C.  Liver function testing at last visit within normal limits.  Recommendation for treatment for chronic hepatitis C postpartum with gastroenterology  Return to office in 1 week, continue with weekly nonstress test      Patient was counseled to the following pregnancy precautions:  Decreased fetal movement, if concern for decreased fetal movement please perform fetal kick counts you are looking for 10 movements in 2 hours.  If concern for fetal movement and not meeting that criteria, please present to triage for evaluation.  Contractions occurring every 5 minutes for over an hour, lasting 30 to 60 seconds and progressively causing more discomfort, please seek medical attention to rule out labor  If you believe that your water is broken, place a sanitary pad.  If pad fills in short period of time i.e. less than 5 minutes, take off pad placed another pad.  If this is saturated please present for rule out  rupture of membranes  Vaginal bleeding can be normal in pregnancy, this usually takes a form of spotting.  If having heavier bleeding like a menstrual period please present for evaluation; especially in light of severe abdominal pain this could represent a placental abruption.  Keep all scheduled appointments as recommended.        Nik Norton MD  09/21/2023

## 2023-09-21 NOTE — PROGRESS NOTES
OB FOLLOW UP      Chief Complaint   Patient presents with    Routine Prenatal Visit     Subjective:   No complaints    Objective:  /71   Wt 93.9 kg (207 lb)   LMP  (LMP Unknown)   BMI 37.86 kg/m²  -1.361 kg (-3 lb) Facility age limit for growth percentiles is 20 years.  See OB flow sheet for fundal height (not performed if US day of OV), FHT, edema, cvx exam if performed, and Upro/Uglu  Pelvic exam : GBS RV swab performed today    Assessment and Plan:  36w0d   Reassuring pregnancy progress.  Questions answered.  Diagnoses and all orders for this visit:    1. Supervision of other normal pregnancy, antepartum (Primary)  Overview:  GLORY finalized: 10/27/23 per 21week anatomy scan    Genetic testing (NIPS-Quad)/CF/AFP:  Discussed all, declines    COVID: Fully vaccinated.  Recommended covid update  Flu: Recommended   Tdap: Recommended, s/p Rx    Rhogam: completed     Anatomy US: 6/22/23 normal anatomy, posterior placenta,  grams, 74%, AC 56%  FU US: 9/21/2023 EFW (32), 5 pounds 5 ounces.  AC (11).  Vertex presentation posterior placenta grade 2.  BPP 8/8    PROBLEM LIST/PLAN:   Late care - UDS +THC 9/1 9/29/2023 not using vape pen since then, recheck next OV  Hepatitis C - 9/1 nl LFTs, HepC RNA elevated   GI hepatitis clinic PP  Obesity in preg- pre preg BMI 38   Weekly NST q Fri   Growth US- done        Orders:  -     POC Urinalysis Dipstick  -     Group B Streptococcus Culture - Swab, Vaginal/Rectum    2. Obesity in pregnancy, antepartum  -     Fetal Nonstress Test; Standing      Counseling:    OB precautions, leaking, VB, jimena rosado vs PTL/Labor  FKC    Continue PNV.  Importance of healthy eating, obtaining sufficient sleep, and staying active unless hypertensive- activity modified as directed.    Return in about 1 year (around 9/29/2024) for FU OB q1wk to GLORY/Delivery.            Janene Clay, DO  09/29/2023    [facility] [place] [facility]  [address] [place]  [place] [address]  Dept: [phone]  Dept Fax: [phone]  Loc: [phone]  Loc Fax: [phone]

## 2023-09-27 ENCOUNTER — PATIENT OUTREACH (OUTPATIENT)
Dept: LABOR AND DELIVERY | Facility: HOSPITAL | Age: 33
End: 2023-09-27
Payer: MEDICAID

## 2023-09-27 PROBLEM — O99.210 OBESITY IN PREGNANCY, ANTEPARTUM: Status: ACTIVE | Noted: 2023-09-27

## 2023-09-27 NOTE — OUTREACH NOTE
[facility]  Check-In    Current Estimated Gestational Age: 35w5d      Questions/Answers      Flowsheet Row Responses   Best Method for Contacting Cell   Demographics Reviewed Yes   Currently Employed No   Able to keep appointments as scheduled Yes   Gender(s) and Name(s) Unruly Hou   Baby Active/Feeling Fetal Movemen Yes   How are you presently feeling? Good   Supplies ready for baby Breast Pump, Car Seat, Clothing, Crib, Diapers   Resource/Environmental Concerns None   Do you have any questions related to your care experience, your pregnancy, plans for delivery, any concerns, etc? No   Other Education How to find a pediatrician            Rosalind Hensley RN  Maternity Nurse Navigator    9/21/2023, 14:40 EDT

## 2023-09-29 ENCOUNTER — HOSPITAL ENCOUNTER (OUTPATIENT)
Dept: LABOR AND DELIVERY | Facility: HOSPITAL | Age: 33
Discharge: HOME | End: 2023-09-29
Payer: MEDICAID

## 2023-09-29 ENCOUNTER — ROUTINE PRENATAL (OUTPATIENT)
Dept: OBSTETRICS AND GYNECOLOGY | Facility: CLINIC | Age: 33
End: 2023-09-29
Payer: MEDICAID

## 2023-09-29 ENCOUNTER — HOSPITAL ENCOUNTER (OUTPATIENT)
Facility: HOSPITAL | Age: 33
Discharge: HOME | End: 2023-09-29
Attending: OBSTETRICS & GYNECOLOGY | Admitting: OBSTETRICS & GYNECOLOGY
Payer: MEDICAID

## 2023-09-29 VITALS — BODY MASS INDEX: 37.86 KG/M2 | WEIGHT: 207 LBS | SYSTOLIC BLOOD PRESSURE: 115 MMHG | DIASTOLIC BLOOD PRESSURE: 71 MMHG

## 2023-09-29 DIAGNOSIS — Z34.80 SUPERVISION OF OTHER NORMAL PREGNANCY, ANTEPARTUM: Primary | ICD-10-CM

## 2023-09-29 DIAGNOSIS — O99.210 OBESITY IN PREGNANCY, ANTEPARTUM: ICD-10-CM

## 2023-09-29 LAB
GLUCOSE UR STRIP-MCNC: NEGATIVE MG/DL
PROT UR STRIP-MCNC: ABNORMAL MG/DL

## 2023-09-29 PROCEDURE — 59025 FETAL NON-STRESS TEST: CPT

## 2023-09-29 PROCEDURE — 87081 CULTURE SCREEN ONLY: CPT | Performed by: OBSTETRICS & GYNECOLOGY

## 2023-09-29 NOTE — NON STRESS TEST
Obstetrical Non-stress Test Interpretation     Name:  Hortencia Canada  MRN: [id number]    33 y.o. female  at 36w0d    Indication: CHRONIC HEP C/OBESITY      Fetal Assessment  Fetal Movement: active  Fetal HR Assessment Method: external  Fetal HR Baseline: normal range  Fetal HR Variability: moderate (amplitude range 6 to 25 bpm)  Fetal HR Accelerations: greater than/equal to 15 bpm, lasting at least 15 seconds  Fetal HR Decelerations: absent    LMP  (LMP Unknown)     Reason for test: Other (Comment)  Date of Test: 2023  Time frame of test:   RN NST Interpretation: Reactive      Rosalindjosh Jeronimo  2023  18:42 EDT

## 2023-09-30 NOTE — SIGNIFICANT NOTE
09/29/23 2333   Nonstress Test   Reason for NST Other (Comment)  (hep C, elevated bmi)   Acoustic Stimulator No   Uterine Irritability No   Contractions Not present   Fetal Assessment   Fetal Movement active   Fetal HR Assessment Method external   Fetal HR (beats/min) 130  (130's)   Fetal HR Baseline normal range   Fetal HR Variability moderate (amplitude range 6 to 25 bpm)   Fetal HR Accelerations episodic;greater than/equal to 15 bpm;lasting at least 15 seconds   Fetal HR Decelerations absent   Sinusoidal Pattern Present absent   Fetal HR Tracing Category Category I   Interpretation A   Nonstress Test Interpretation A Reactive

## 2023-10-01 LAB — BACTERIA SPEC AEROBE CULT: NORMAL

## 2023-10-02 NOTE — PROGRESS NOTES
OB FOLLOW UP      Chief Complaint   Patient presents with    Routine Prenatal Visit     Subjective:   No complaints    Objective:  /76   Wt 93.4 kg (206 lb)   LMP  (LMP Unknown)   BMI 37.68 kg/m²  -1.814 kg (-4 lb) Facility age limit for growth percentiles is 20 years.  See OB flow sheet for fundal height (not performed if US day of OV), FHT, edema, cvx exam if performed, and Upro/Uglu      Assessment and Plan:  37w0d   Reassuring pregnancy progress.  Questions answered.  Diagnoses and all orders for this visit:    1. Supervision of other normal pregnancy, antepartum (Primary)  Overview:  GLORY finalized: 10/27/23 per 21week anatomy scan    Genetic testing (NIPS-Quad)/CF/AFP:  Declined all    COVID: Fully vaccinated.  Recommended covid update  Flu: Recommended   Tdap: Recommended, s/p Rx    Rhogam: completed     Anatomy US: 6/22/23 normal anatomy, posterior placenta,  grams, 74%, AC 56%  FU US: 9/21/2023 EFW (32), 5 pounds 5 ounces.  AC (11).  Vertex presentation posterior placenta grade 2.  BPP 8/8    PROBLEM LIST/PLAN:   Late care - UDS +THC 9/1 9/29/2023 not using vape pen since then, recheck 10/6/2023   Hepatitis C - 9/1 nl LFTs, HepC RNA elevated   GI hepatitis clinic PP  Obesity in preg- pre preg BMI 38   Weekly NST q Fri   Growth US- done    26Oct 39+6 IOL AP         Orders:  -     POC Urinalysis Dipstick  -     Urine Drug Screen - Urine, Clean Catch      Counseling:    OB precautions, leaking, VB, jimena rosado vs PTL/Labor  FKC  IOL scheduled    Continue PNV.  Importance of healthy eating, obtaining sufficient sleep, and staying active unless hypertensive- activity modified as directed.    Return in about 1 week (around 10/13/2023) for FU OB q1wk to GLORY/Delivery.            Janene Clay, DO  10/06/2023    [facility] [place] [facility]  [address] [place] [address]  Dept: [phone]  Dept Fax: [phone]  Loc: [phone]  Loc Fax: [phone]

## 2023-10-06 ENCOUNTER — HOSPITAL ENCOUNTER (OUTPATIENT)
Facility: HOSPITAL | Age: 33
Discharge: HOME | End: 2023-10-06
Attending: STUDENT IN AN ORGANIZED HEALTH CARE EDUCATION/TRAINING PROGRAM | Admitting: STUDENT IN AN ORGANIZED HEALTH CARE EDUCATION/TRAINING PROGRAM
Payer: MEDICAID

## 2023-10-06 ENCOUNTER — TELEPHONE (OUTPATIENT)
Dept: OBSTETRICS AND GYNECOLOGY | Facility: CLINIC | Age: 33
End: 2023-10-06
Payer: MEDICAID

## 2023-10-06 ENCOUNTER — ROUTINE PRENATAL (OUTPATIENT)
Dept: OBSTETRICS AND GYNECOLOGY | Facility: CLINIC | Age: 33
End: 2023-10-06
Payer: MEDICAID

## 2023-10-06 ENCOUNTER — HOSPITAL ENCOUNTER (OUTPATIENT)
Dept: LABOR AND DELIVERY | Facility: HOSPITAL | Age: 33
Discharge: HOME | End: 2023-10-06
Payer: MEDICAID

## 2023-10-06 VITALS
RESPIRATION RATE: 18 BRPM | OXYGEN SATURATION: 99 % | DIASTOLIC BLOOD PRESSURE: 66 MMHG | SYSTOLIC BLOOD PRESSURE: 109 MMHG | HEART RATE: 92 BPM

## 2023-10-06 VITALS — BODY MASS INDEX: 37.68 KG/M2 | WEIGHT: 206 LBS | SYSTOLIC BLOOD PRESSURE: 118 MMHG | DIASTOLIC BLOOD PRESSURE: 76 MMHG

## 2023-10-06 DIAGNOSIS — Z34.80 SUPERVISION OF OTHER NORMAL PREGNANCY, ANTEPARTUM: Primary | ICD-10-CM

## 2023-10-06 LAB
AMPHET+METHAMPHET UR QL: NEGATIVE
BARBITURATES UR QL SCN: NEGATIVE
BENZODIAZ UR QL SCN: NEGATIVE
CANNABINOIDS SERPL QL: NEGATIVE
COCAINE UR QL: NEGATIVE
FENTANYL UR-MCNC: NEGATIVE NG/ML
GLUCOSE UR STRIP-MCNC: NEGATIVE MG/DL
METHADONE UR QL SCN: NEGATIVE
OPIATES UR QL: NEGATIVE
OXYCODONE UR QL SCN: NEGATIVE
PROT UR STRIP-MCNC: NEGATIVE MG/DL

## 2023-10-06 PROCEDURE — 80307 DRUG TEST PRSMV CHEM ANLYZR: CPT | Performed by: OBSTETRICS & GYNECOLOGY

## 2023-10-06 PROCEDURE — 59025 FETAL NON-STRESS TEST: CPT

## 2023-10-06 PROCEDURE — G0463 HOSPITAL OUTPT CLINIC VISIT: HCPCS

## 2023-10-06 PROCEDURE — 59025 FETAL NON-STRESS TEST: CPT | Performed by: STUDENT IN AN ORGANIZED HEALTH CARE EDUCATION/TRAINING PROGRAM

## 2023-10-06 NOTE — NON STRESS TEST
Obstetrical Non-stress Test Interpretation     Name:  Hortencia Canada  MRN: [id number]    33 y.o. female  at 37w0d    Indication:NST/CHRONIC HEPATITIS C    Fetal Assessment  Fetal Movement: active  Fetal HR Assessment Method: external  Fetal HR (beats/min): 130  Fetal HR Variability: moderate (amplitude range 6 to 25 bpm)  Fetal HR Accelerations: lasting at least 15 seconds  Fetal HR Decelerations: absent    /66 (BP Location: Right arm, Patient Position: Sitting)   Pulse 92   Resp 18   LMP  (LMP Unknown)   SpO2 99%     Reason for test: OB Triage (NST/CHRONIC HEPATITS C)  Date of Test: 10/6/2023  Time frame of test: 3435-8163  RN NST Interpretation: Reactive      Mahsa Sotomayor RN  10/6/2023  15:59 EDT

## 2023-10-09 NOTE — PROGRESS NOTES
OB FOLLOW UP      Chief Complaint   Patient presents with    Routine Prenatal Visit     Subjective:   No complaints    Objective:  /82   Wt 94.8 kg (209 lb)   LMP  (LMP Unknown)   BMI 38.23 kg/m²  -0.454 kg (-1 lb) Facility age limit for growth %flavio is 20 years.  See OB flow sheet for fundal height (not performed if US day of OV), FHT, edema, cvx exam if performed, and Upro/Uglu      Assessment and Plan:  38w3d   Reassuring pregnancy progress.  Questions answered.  Diagnoses and all orders for this visit:    1. Supervision of other normal pregnancy, antepartum (Primary)  Overview:  GLORY finalized: 10/27/23 per 21week anatomy scan    Genetic testing (NIPS-Quad)/CF/AFP:  Declined all    COVID: Vaccinated w 2023 updated  Flu: Vaccinated  Tdap: Vaccinated    Rhogam: completed     Anatomy US: 6/22/23 normal anatomy, posterior placenta,  grams, 74%, AC 56%  FU US: 9/21/2023 EFW (32), 5 pounds 5 ounces.  AC (11).  Vertex presentation posterior placenta grade 2.  BPP 8/8    PROBLEM LIST/PLAN:   Late care - UDS +THC 9/1   10/6 UDS NEG   Hepatitis C - 9/1 nl LFTs, HepC RNA elevated   GI hepatitis clinic PP  Obesity in preg- pre preg BMI 38   Weekly NST q Tues   Growth US- done    26Oct 39+6 IOL AP         Orders:  -     POC Urinalysis Dipstick      Counseling:    OB precautions, leaking, VB, jimena rosado vs PTL/Labor  FKC    Continue PNV.  Importance of healthy eating, obtaining sufficient sleep, and staying active unless hypertensive- activity modified as directed.    Return in about 1 week (around 10/23/2023) for FU OB.            Janene Clay, DO  10/16/2023    [facility] [place] [facility]  [facility] [place] [address]  Dept: [phone]  Dept Fax: [phone]  Loc: [phone]  Loc Fax: [phone]

## 2023-10-09 NOTE — PROGRESS NOTES
OB FOLLOW UP      Chief Complaint   Patient presents with    Routine Prenatal Visit     Subjective:   No complaints    Objective:  /80   Wt 93.9 kg (207 lb)   LMP  (LMP Unknown)   BMI 37.86 kg/mý  -1.361 kg (-3 lb) Facility age limit for growth %flavio is 20 years.  See OB flow sheet for fundal height (not performed if US day of OV), FHT, edema, cvx exam if performed, and Upro/Uglu      Assessment and Plan:  37w6d   Reassuring pregnancy progress.  Questions answered.  Diagnoses and all orders for this visit:    1. Supervision of other normal pregnancy, antepartum (Primary)  Overview:  GLORY finalized: 10/27/23 per 21week anatomy scan    Genetic testing (NIPS-Quad)/CF/AFP:  Declined all    COVID: Vaccinated.  Recommended 2023 covid.  Flu: Recommended   Tdap: Recommended, s/p Rx    Rhogam: completed     Anatomy US: 6/22/23 normal anatomy, posterior placenta,  grams, 74%, AC 56%  FU US: 9/21/2023 EFW (32), 5 pounds 5 ounces.  AC (11).  Vertex presentation posterior placenta grade 2.  BPP 8/8    PROBLEM LIST/PLAN:   Late care - UDS +THC 9/1   10/6 UDS NEG   Hepatitis C - 9/1 nl LFTs, HepC RNA elevated   GI hepatitis clinic PP  Obesity in preg- pre preg BMI 38   Weekly NST q Thu/Fri   Growth US- done    26Oct 39+6 IOL AP         Orders:  -     POC Urinalysis Dipstick      Counseling:    OB precautions, leaking, VB, jimena rosado vs PTL/Labor  FKC    Continue PNV.  Importance of healthy eating, obtaining sufficient sleep, and staying active unless hypertensive- activity modified as directed.    Return in about 1 week (around 10/19/2023) for FU OB q1wk to GLORY/Delivery.            Janene Clay, DO  10/12/2023    [facility] [place] [place] [facility]  [address] [place] [address]  Dept: [phone]  Dept Fax: [phone]  Loc: [phone]  Loc Fax: [phone]

## 2023-10-12 ENCOUNTER — HOSPITAL ENCOUNTER (OUTPATIENT)
Dept: LABOR AND DELIVERY | Facility: HOSPITAL | Age: 33
Discharge: HOME | End: 2023-10-12
Payer: MEDICAID

## 2023-10-12 ENCOUNTER — HOSPITAL ENCOUNTER (OUTPATIENT)
Facility: HOSPITAL | Age: 33
Discharge: HOME | End: 2023-10-12
Attending: OBSTETRICS & GYNECOLOGY | Admitting: OBSTETRICS & GYNECOLOGY
Payer: MEDICAID

## 2023-10-12 ENCOUNTER — ROUTINE PRENATAL (OUTPATIENT)
Dept: OBSTETRICS AND GYNECOLOGY | Facility: CLINIC | Age: 33
End: 2023-10-12
Payer: MEDICAID

## 2023-10-12 VITALS — DIASTOLIC BLOOD PRESSURE: 75 MMHG | HEART RATE: 100 BPM | SYSTOLIC BLOOD PRESSURE: 116 MMHG | RESPIRATION RATE: 18 BRPM

## 2023-10-12 VITALS — WEIGHT: 207 LBS | DIASTOLIC BLOOD PRESSURE: 80 MMHG | BODY MASS INDEX: 37.86 KG/M2 | SYSTOLIC BLOOD PRESSURE: 121 MMHG

## 2023-10-12 DIAGNOSIS — O99.210 OBESITY IN PREGNANCY, ANTEPARTUM: ICD-10-CM

## 2023-10-12 DIAGNOSIS — Z34.80 SUPERVISION OF OTHER NORMAL PREGNANCY, ANTEPARTUM: Primary | ICD-10-CM

## 2023-10-12 LAB
GLUCOSE UR STRIP-MCNC: NEGATIVE MG/DL
PROT UR STRIP-MCNC: ABNORMAL MG/DL

## 2023-10-12 PROCEDURE — 59025 FETAL NON-STRESS TEST: CPT | Performed by: OBSTETRICS & GYNECOLOGY

## 2023-10-12 PROCEDURE — 59025 FETAL NON-STRESS TEST: CPT

## 2023-10-12 NOTE — NON STRESS TEST
Obstetrical Non-stress Test Interpretation     Name:  Hortencia Canada  MRN: [id number]    33 y.o. female  at 37w6d    Indication: obesity      Fetal Movement: active  Fetal HR Assessment Method: external  Fetal HR (beats/min): 130  Fetal HR Baseline: normal range  Fetal HR Variability: moderate (amplitude range 6 to 25 bpm)  Fetal HR Accelerations: episodic, greater than/equal to 15 bpm, lasting at least 15 seconds  Fetal HR Decelerations: absent  Sinusoidal Pattern Present: absent    /75 (BP Location: Right arm, Patient Position: Sitting)   Pulse 100   Resp 18   LMP  (LMP Unknown)     Reason for test: Other (Comment) (obesity)  Date of Test: 10/12/2023  Time frame of test: 1608-0171  RN NST Interpretation: Reactive      Hortencia Tran RN  10/12/2023  11:33 EDT

## 2023-10-12 NOTE — NON STRESS TEST
EUNICE Souza   OB NST Note    10/12/2023   Name:  Hortencia Canada  MRN: [id number]    Subjective:  33 y.o.  at 37w6d    Indication: Obesity in pregnancy    NST:   Baseline: 130  Variability:   Moderate/Normal (amplitude 6-25 bpm)  Accelerations: Present (32 weeks+) 15 x 15 bpm  Decelerations: Absent   Contractions:  Not regular    NST interpretation: reactive    Documented Vitals    10/12/23 1115   BP: 116/75   Pulse: 100   Resp: 18         Lab Results (last 24 hours)       Procedure Component Value Units Date/Time    POC Urinalysis Dipstick [[id number]]  (Abnormal) Collected: 10/12/23 1024    Specimen: Urine Updated: 10/12/23 1026     Glucose, UA Negative mg/dL      Protein, POC Trace mg/dL              Assessment:  33 y.o.  AT 37w6d  Obesity in pregnancy    Plan:   OB Precautions, FKC, Keep scheduled NSTs, Keep office visit, Keep scheduled IOL      Electronically signed by Dusty Mayen MD, 10/12/23, 11:49 AM EDT.

## 2023-10-16 ENCOUNTER — ROUTINE PRENATAL (OUTPATIENT)
Dept: OBSTETRICS AND GYNECOLOGY | Facility: CLINIC | Age: 33
End: 2023-10-16
Payer: MEDICAID

## 2023-10-16 VITALS — DIASTOLIC BLOOD PRESSURE: 82 MMHG | SYSTOLIC BLOOD PRESSURE: 115 MMHG | WEIGHT: 209 LBS | BODY MASS INDEX: 38.23 KG/M2

## 2023-10-16 DIAGNOSIS — Z34.80 SUPERVISION OF OTHER NORMAL PREGNANCY, ANTEPARTUM: Primary | ICD-10-CM

## 2023-10-16 LAB
GLUCOSE UR STRIP-MCNC: NEGATIVE MG/DL
PROT UR STRIP-MCNC: ABNORMAL MG/DL

## 2023-10-23 NOTE — PROGRESS NOTES
OB FOLLOW UP      Chief Complaint   Patient presents with    Routine Prenatal Visit     Subjective:   No complaints    Objective:  /82   Wt 93.9 kg (207 lb)   LMP  (LMP Unknown)   BMI 37.86 kg/m²  -1.361 kg (-3 lb) Facility age limit for growth %flavio is 20 years.  See OB flow sheet for fundal height (not performed if US day of OV), FHT, edema, cvx exam if performed, and Upro/Uglu      Assessment and Plan:  39w4d   Reassuring pregnancy progress.  Questions answered.  Diagnoses and all orders for this visit:    1. Supervision of other normal pregnancy, antepartum (Primary)  Overview:  GLORY finalized: 10/27/23 per 21week anatomy scan    Genetic testing (NIPS-Quad)/CF/AFP:  Declined all    COVID: Vaccinated w 2023 updated  Flu: Vaccinated  Tdap: Vaccinated    Rhogam: completed     Anatomy US: 6/22/23 normal anatomy, posterior placenta,  grams, 74%, AC 56%  FU US: 9/21/2023 EFW (32), 5 pounds 5 ounces.  AC (11).  Vertex presentation posterior placenta grade 2.  BPP 8/8    PROBLEM LIST/PLAN:   Late care - UDS +THC 9/1   10/6 UDS NEG   Hepatitis C - 9/1 nl LFTs, HepC RNA elevated   GI hepatitis clinic PP  Obesity in preg- pre preg BMI 38   Weekly NST q Tues   Growth US- done    26Oct 39+6 IOL AP         Orders:  -     POC Urinalysis Dipstick      Counseling:    OB precautions, leaking, VB, jimena rosado vs PTL/Labor  FKC  IOL reviewed in detail.  R/B/A/SE/E.  All history reviewed and updated.  Pre-IOL exam performed.  Length can be 24-48+hrs.  PLAN: cytotec then pit and cvx cath.  All questions answered.  She desires to proceed as planned.  She understands during early am the OB Hospitalist physicians will manage her labor and deliver prn any emergencies.      Continue PNV.  Importance of healthy eating, obtaining sufficient sleep, and staying active unless hypertensive- activity modified as directed.    Return in about 5 weeks (around 11/28/2023) for Postpartum FU.            Janene Clay,  DO  10/24/2023    [facility] [place] [facility]  [address] [place] [address]  Dept: [phone]  Dept Fax: [phone]  Loc: [phone]  Loc Fax: [phone]

## 2023-10-24 ENCOUNTER — ROUTINE PRENATAL (OUTPATIENT)
Dept: OBSTETRICS AND GYNECOLOGY | Facility: CLINIC | Age: 33
End: 2023-10-24
Payer: MEDICAID

## 2023-10-24 VITALS — WEIGHT: 207 LBS | SYSTOLIC BLOOD PRESSURE: 129 MMHG | BODY MASS INDEX: 37.86 KG/M2 | DIASTOLIC BLOOD PRESSURE: 82 MMHG

## 2023-10-24 DIAGNOSIS — Z34.80 SUPERVISION OF OTHER NORMAL PREGNANCY, ANTEPARTUM: Primary | ICD-10-CM

## 2023-10-24 LAB
GLUCOSE UR STRIP-MCNC: NEGATIVE MG/DL
PROT UR STRIP-MCNC: NEGATIVE MG/DL

## 2023-10-25 ENCOUNTER — PREP FOR SURGERY (OUTPATIENT)
Dept: OTHER | Facility: HOSPITAL | Age: 33
End: 2023-10-25
Payer: MEDICAID

## 2023-10-25 DIAGNOSIS — O98.419 CHRONIC HEPATITIS C COMPLICATING PREGNANCY, ANTEPARTUM: ICD-10-CM

## 2023-10-25 DIAGNOSIS — O09.30 LATE PRENATAL CARE AFFECTING PREGNANCY, ANTEPARTUM: ICD-10-CM

## 2023-10-25 DIAGNOSIS — B18.2 CHRONIC HEPATITIS C COMPLICATING PREGNANCY, ANTEPARTUM: ICD-10-CM

## 2023-10-25 DIAGNOSIS — Z34.90 ENCOUNTER FOR INDUCTION OF LABOR: Primary | ICD-10-CM

## 2023-10-25 RX ORDER — ACETAMINOPHEN 325 MG/1
650 TABLET ORAL ONCE AS NEEDED
Status: CANCELLED | OUTPATIENT
Start: 2023-10-25

## 2023-10-25 RX ORDER — OXYTOCIN/0.9 % SODIUM CHLORIDE 30/500 ML
250 PLASTIC BAG, INJECTION (ML) INTRAVENOUS CONTINUOUS
Status: CANCELLED | OUTPATIENT
Start: 2023-10-25 | End: 2023-10-25

## 2023-10-25 RX ORDER — SODIUM CHLORIDE 0.9 % (FLUSH) 0.9 %
10 SYRINGE (ML) INJECTION EVERY 12 HOURS SCHEDULED
Status: CANCELLED | OUTPATIENT
Start: 2023-10-25

## 2023-10-25 RX ORDER — FAMOTIDINE 20 MG/1
20 TABLET, FILM COATED ORAL 2 TIMES DAILY PRN
Status: CANCELLED | OUTPATIENT
Start: 2023-10-25

## 2023-10-25 RX ORDER — PROMETHAZINE HYDROCHLORIDE 25 MG/1
25 TABLET ORAL EVERY 6 HOURS PRN
Status: CANCELLED | OUTPATIENT
Start: 2023-10-25

## 2023-10-25 RX ORDER — METHYLERGONOVINE MALEATE 0.2 MG/ML
200 INJECTION INTRAVENOUS ONCE AS NEEDED
Status: CANCELLED | OUTPATIENT
Start: 2023-10-25

## 2023-10-25 RX ORDER — PROMETHAZINE HYDROCHLORIDE 12.5 MG/1
12.5 TABLET ORAL EVERY 6 HOURS PRN
Status: CANCELLED | OUTPATIENT
Start: 2023-10-25

## 2023-10-25 RX ORDER — IBUPROFEN 800 MG/1
800 TABLET ORAL ONCE AS NEEDED
Status: CANCELLED | OUTPATIENT
Start: 2023-10-25

## 2023-10-25 RX ORDER — MAGNESIUM CARB/ALUMINUM HYDROX 105-160MG
30 TABLET,CHEWABLE ORAL ONCE
Status: CANCELLED | OUTPATIENT
Start: 2023-10-25 | End: 2023-10-25

## 2023-10-25 RX ORDER — ONDANSETRON 2 MG/ML
4 INJECTION INTRAMUSCULAR; INTRAVENOUS EVERY 6 HOURS PRN
Status: CANCELLED | OUTPATIENT
Start: 2023-10-25

## 2023-10-25 RX ORDER — MISOPROSTOL 200 UG/1
800 TABLET ORAL AS NEEDED
Status: CANCELLED | OUTPATIENT
Start: 2023-10-25

## 2023-10-25 RX ORDER — CITRIC ACID/SODIUM CITRATE 334-500MG
30 SOLUTION, ORAL ORAL ONCE AS NEEDED
Status: CANCELLED | OUTPATIENT
Start: 2023-10-25

## 2023-10-25 RX ORDER — HYDROCODONE BITARTRATE AND ACETAMINOPHEN 5; 325 MG/1; MG/1
1 TABLET ORAL EVERY 4 HOURS PRN
Status: CANCELLED | OUTPATIENT
Start: 2023-10-25 | End: 2023-11-01

## 2023-10-25 RX ORDER — SODIUM CHLORIDE 0.9 % (FLUSH) 0.9 %
10 SYRINGE (ML) INJECTION AS NEEDED
Status: CANCELLED | OUTPATIENT
Start: 2023-10-25

## 2023-10-25 RX ORDER — ONDANSETRON 4 MG/1
4 TABLET, FILM COATED ORAL EVERY 6 HOURS PRN
Status: CANCELLED | OUTPATIENT
Start: 2023-10-25

## 2023-10-25 RX ORDER — SODIUM CHLORIDE, SODIUM LACTATE, POTASSIUM CHLORIDE, CALCIUM CHLORIDE 600; 310; 30; 20 MG/100ML; MG/100ML; MG/100ML; MG/100ML
125 INJECTION, SOLUTION INTRAVENOUS CONTINUOUS
Status: CANCELLED | OUTPATIENT
Start: 2023-10-25

## 2023-10-25 RX ORDER — FAMOTIDINE 10 MG/ML
20 INJECTION, SOLUTION INTRAVENOUS ONCE AS NEEDED
Status: CANCELLED | OUTPATIENT
Start: 2023-10-25

## 2023-10-25 RX ORDER — FAMOTIDINE 20 MG/1
20 TABLET, FILM COATED ORAL ONCE AS NEEDED
Status: CANCELLED | OUTPATIENT
Start: 2023-10-25

## 2023-10-25 RX ORDER — SODIUM CHLORIDE 9 MG/ML
40 INJECTION, SOLUTION INTRAVENOUS AS NEEDED
Status: CANCELLED | OUTPATIENT
Start: 2023-10-25

## 2023-10-25 RX ORDER — ACETAMINOPHEN 325 MG/1
650 TABLET ORAL EVERY 4 HOURS PRN
Status: CANCELLED | OUTPATIENT
Start: 2023-10-25

## 2023-10-25 RX ORDER — FAMOTIDINE 10 MG/ML
20 INJECTION, SOLUTION INTRAVENOUS 2 TIMES DAILY PRN
Status: CANCELLED | OUTPATIENT
Start: 2023-10-25

## 2023-10-25 RX ORDER — OXYTOCIN/0.9 % SODIUM CHLORIDE 30/500 ML
999 PLASTIC BAG, INJECTION (ML) INTRAVENOUS ONCE
Status: CANCELLED | OUTPATIENT
Start: 2023-10-25 | End: 2023-10-25

## 2023-10-25 RX ORDER — MISOPROSTOL 100 MCG
25 TABLET ORAL ONCE
Status: CANCELLED | OUTPATIENT
Start: 2023-10-25 | End: 2023-10-25

## 2023-10-25 RX ORDER — LIDOCAINE HYDROCHLORIDE 10 MG/ML
0.5 INJECTION, SOLUTION INFILTRATION; PERINEURAL ONCE AS NEEDED
Status: CANCELLED | OUTPATIENT
Start: 2023-10-25

## 2023-10-25 RX ORDER — TERBUTALINE SULFATE 1 MG/ML
0.25 INJECTION, SOLUTION SUBCUTANEOUS AS NEEDED
Status: CANCELLED | OUTPATIENT
Start: 2023-10-25

## 2023-10-25 RX ORDER — HYDROCODONE BITARTRATE AND ACETAMINOPHEN 10; 325 MG/1; MG/1
1 TABLET ORAL EVERY 4 HOURS PRN
Status: CANCELLED | OUTPATIENT
Start: 2023-10-25 | End: 2023-11-01

## 2023-10-25 RX ORDER — CEFAZOLIN SODIUM 2 G/100ML
2000 INJECTION, SOLUTION INTRAVENOUS ONCE AS NEEDED
Status: CANCELLED | OUTPATIENT
Start: 2023-10-25 | End: 2023-10-29

## 2023-10-25 RX ORDER — METOCLOPRAMIDE HYDROCHLORIDE 5 MG/ML
10 INJECTION INTRAMUSCULAR; INTRAVENOUS ONCE AS NEEDED
Status: CANCELLED | OUTPATIENT
Start: 2023-10-25

## 2023-10-25 RX ORDER — OXYTOCIN/0.9 % SODIUM CHLORIDE 30/500 ML
0-4 PLASTIC BAG, INJECTION (ML) INTRAVENOUS CONTINUOUS PRN
Status: CANCELLED | OUTPATIENT
Start: 2023-10-25

## 2023-10-25 NOTE — H&P
OB HISTORY AND PHYSICAL      SUBJECTIVE:    33 y.o. female  currently at 39w5d [facility] complicated by:      Patient Active Problem List    Diagnosis     Supervision of other normal pregnancy, antepartum [Z34.80]     Obesity in pregnancy, antepartum [O99.210]     Mild tetrahydrocannabinol (THC) abuse [F12.10]     Rh negative, antepartum [O26.899, Z67.91]     Chronic hepatitis C complicating pregnancy, antepartum [O98.419, B18.2]     Late prenatal care affecting pregnancy, antepartum [O09.30]        CC/HPI:  Presents with Scheduled IOL.     ROS: No leaking fluid, No vaginal bleeding, No contractions, and Is feeling adequate FM    Past OB History:   OB History    Para Term  AB Living   2 1 1     1   SAB IAB Ectopic Molar Multiple Live Births             1      # Outcome Date GA Lbr Juan Miguel/2nd Weight Sex Delivery Anes PTL Lv   2 Current            1 Term 17 41w1d  3317 g (7 lb 5 oz) M Vag-Spont  N LUL        Prenatal Labs:    Prenatal Results       Initial Prenatal Labs       Test Value Reference Range Date Time    Hemoglobin  12.4 g/dL 12.0 - 15.9 06/15/23 1508    Hematocrit  36.3 % 34.0 - 46.6 06/15/23 1508    Platelets  272 10*3/mm3 140 - 450 06/15/23 1508    Rubella IgG  1.86 index Immune >0.99 06/15/23 1508    Hepatitis B SAg  Non-Reactive  Non-Reactive 06/15/23 1508    Hepatitis C Ab  Reactive  Non-Reactive 06/15/23 1508    RPR        T. Pallidum Ab   Non-Reactive  Non-Reactive 06/15/23 1508    ABO  A   23 1412    Rh  Negative   23 1412    Antibody Screen  Negative   06/15/23 1508    HIV  Non-Reactive  Non-Reactive 06/15/23 1508    Urine Culture  No growth   06/15/23 1508    Gonorrhea  Negative  Negative 06/15/23 1533    Chlamydia  Negative  Negative 06/15/23 1533    TSH        HgB A1c         Varicella IgG        HgB Electrophoresis         Cystic fibrosis                   Fetal testing        Test Value Reference Range Date Time    NIPT        MSAFP        AFP-4                   2nd and 3rd Trimester       Test Value Reference Range Date Time    Hemoglobin (repeated)  13.0 g/dL 12.0 - 15.9 07/20/23 1340    Hematocrit (repeated)  38.3 % 34.0 - 46.6 07/20/23 1340    Platelets   256 10*3/mm3 140 - 450 07/20/23 1340       272 10*3/mm3 140 - 450 06/15/23 1508    GCT  94 mg/dL 65 - 139 07/20/23 1340    Antibody Screen (repeated)  Negative   06/15/23 1508    GTT Fasting        GTT 1 Hr        GTT 2 Hr        GTT 3 Hr        Group B Strep  No Group B Streptococcus isolated   09/29/23 1647              Other testing        Test Value Reference Range Date Time    Parvo IgG         CMV IgG                   Drug Screening       Test Value Reference Range Date Time    Amphetamine Screen        Barbiturate Screen  Negative  Negative 10/06/23 1437       Negative  Negative 09/01/23 1418       Negative  Negative 06/15/23 1513    Benzodiazepine Screen  Negative  Negative 10/06/23 1437       Negative  Negative 09/01/23 1418       Negative  Negative 06/15/23 1513    Methadone Screen  Negative  Negative 10/06/23 1437       Negative  Negative 09/01/23 1418       Negative  Negative 06/15/23 1513    Phencyclidine Screen        Opiates Screen  Negative  Negative 10/06/23 1437       Negative  Negative 09/01/23 1418       Negative  Negative 06/15/23 1513    THC Screen  Negative  Negative 10/06/23 1437       Positive  Negative 09/01/23 1418       Positive  Negative 06/15/23 1513    Cocaine Screen  Negative  Negative 10/06/23 1437       Negative  Negative 09/01/23 1418       Negative  Negative 06/15/23 1513    Propoxyphene Screen        Buprenorphine Screen        Methamphetamine Screen        Oxycodone Screen  Negative  Negative 10/06/23 1437       Negative  Negative 09/01/23 1418       Negative  Negative 06/15/23 1513    Tricyclic Antidepressants Screen                   PMHx:    Past Medical History:   Diagnosis Date    Anxiety     Depression     Hepatitis C        Medications:  prenatal  vitamin    Allergies:  No Known Allergies    PSHx:    Past Surgical History:   Procedure Laterality Date    TONSILLECTOMY         Social History:    reports that she has quit smoking. Her smoking use included cigarettes. She smoked an average of .5 packs per day. She does not have any smokeless tobacco history on file. She reports that she does not currently use drugs. She reports that she does not drink alcohol.    Family History: Non contributory    Immunizations: See prenatal record for Tdap, Flu, Covid and/or other vaccinations    PHYSICAL EXAM:     General- NAD, alert and oriented, appropriate  Psych- normal mood, good memory  Cardiovascular- Regular rhythm, no murnurs  Respiratory- CTA to bases, no wheezes  Abdomen- Gravid, non tender  Fundus-  Non tender.  Size: consistent with dates  EFW- 7 1/2 lbs, ultrasound 9/21 EFW 32%, 5 pounds 5 ounces   Pelvis-  Adequate   Cervix- 1cm / 50% / -3  Presentation- VTX  Extremities/DTRs- No edema, bilaterally equal, no signs of DVT    Fetal HR: Doptones 110-160, see last OV note  Contractions: Not complaining of any regular contractions      ASSESSMENT:  39w5d  Scheduled IOL  Late prenatal care with positive UDS for THC-follow-up UDS negative  Hepatitis C-elevated hep C RNA and normal LFTs in September-plan GI hepatitis clinic postpartum  Prepregnancy BMI 38, obesity in pregnancy  Lab Results   Component Value Date    STREPGPB No Group B Streptococcus isolated 09/29/2023       PLAN:  Admit  Delivery: IOL, cytotec and cervical catheter, then pitocin, OBGYN Hospitalist to admit at pts arrival and manage/deliver prn any emergencies until 0700.  Pt instructed to arrive at 0500    Plan of care [facility] hospital course, R/B/A/potential SE, suspected length <12-24+hrs have been reviewed with patient and any family or friends present, questions answered to her/his/their satisfaction.  Pt desires to proceed as above.    Counseling:The patient was counseled on the risks, benefits and  alternatives of Induction.  Risks reviewed, but are not limited to: bleeding, transfusion, fetal intolerance,  (possibly emergent),  and uterine rupture.  She declines expectant management or  and desires induction.  All her questions have been answered to her satisfaction and she desires to proceed.  Specifically with Cytotec, she understands that it is endorsed by the American College of OB/GYN, but not FDA approved for the induction of labor.          Electronically signed by Janene Clay DO, 10/25/23, 7:42 PM EDT.    EUNICE ARREAGA ORDERS ONLY  [address]  [place] [address]  Dept: [phone]  Loc: [phone]

## 2023-10-25 NOTE — H&P (VIEW-ONLY)
OB HISTORY AND PHYSICAL      SUBJECTIVE:    33 y.o. female  currently at 39w5d [facility] complicated by:      Patient Active Problem List    Diagnosis     Supervision of other normal pregnancy, antepartum [Z34.80]     Obesity in pregnancy, antepartum [O99.210]     Mild tetrahydrocannabinol (THC) abuse [F12.10]     Rh negative, antepartum [O26.899, Z67.91]     Chronic hepatitis C complicating pregnancy, antepartum [O98.419, B18.2]     Late prenatal care affecting pregnancy, antepartum [O09.30]        CC/HPI:  Presents with Scheduled IOL.     ROS: No leaking fluid, No vaginal bleeding, No contractions, and Is feeling adequate FM    Past OB History:   OB History    Para Term  AB Living   2 1 1     1   SAB IAB Ectopic Molar Multiple Live Births             1      # Outcome Date GA Lbr Juan Miguel/2nd Weight Sex Delivery Anes PTL Lv   2 Current            1 Term 17 41w1d  3317 g (7 lb 5 oz) M Vag-Spont  N LUL        Prenatal Labs:    Prenatal Results       Initial Prenatal Labs       Test Value Reference Range Date Time    Hemoglobin  12.4 g/dL 12.0 - 15.9 06/15/23 1508    Hematocrit  36.3 % 34.0 - 46.6 06/15/23 1508    Platelets  272 10*3/mm3 140 - 450 06/15/23 1508    Rubella IgG  1.86 index Immune >0.99 06/15/23 1508    Hepatitis B SAg  Non-Reactive  Non-Reactive 06/15/23 1508    Hepatitis C Ab  Reactive  Non-Reactive 06/15/23 1508    RPR        T. Pallidum Ab   Non-Reactive  Non-Reactive 06/15/23 1508    ABO  A   23 1412    Rh  Negative   23 1412    Antibody Screen  Negative   06/15/23 1508    HIV  Non-Reactive  Non-Reactive 06/15/23 1508    Urine Culture  No growth   06/15/23 1508    Gonorrhea  Negative  Negative 06/15/23 1533    Chlamydia  Negative  Negative 06/15/23 1533    TSH        HgB A1c         Varicella IgG        HgB Electrophoresis         Cystic fibrosis                   Fetal testing        Test Value Reference Range Date Time    NIPT        MSAFP        AFP-4                   2nd and 3rd Trimester       Test Value Reference Range Date Time    Hemoglobin (repeated)  13.0 g/dL 12.0 - 15.9 07/20/23 1340    Hematocrit (repeated)  38.3 % 34.0 - 46.6 07/20/23 1340    Platelets   256 10*3/mm3 140 - 450 07/20/23 1340       272 10*3/mm3 140 - 450 06/15/23 1508    GCT  94 mg/dL 65 - 139 07/20/23 1340    Antibody Screen (repeated)  Negative   06/15/23 1508    GTT Fasting        GTT 1 Hr        GTT 2 Hr        GTT 3 Hr        Group B Strep  No Group B Streptococcus isolated   09/29/23 1647              Other testing        Test Value Reference Range Date Time    Parvo IgG         CMV IgG                   Drug Screening       Test Value Reference Range Date Time    Amphetamine Screen        Barbiturate Screen  Negative  Negative 10/06/23 1437       Negative  Negative 09/01/23 1418       Negative  Negative 06/15/23 1513    Benzodiazepine Screen  Negative  Negative 10/06/23 1437       Negative  Negative 09/01/23 1418       Negative  Negative 06/15/23 1513    Methadone Screen  Negative  Negative 10/06/23 1437       Negative  Negative 09/01/23 1418       Negative  Negative 06/15/23 1513    Phencyclidine Screen        Opiates Screen  Negative  Negative 10/06/23 1437       Negative  Negative 09/01/23 1418       Negative  Negative 06/15/23 1513    THC Screen  Negative  Negative 10/06/23 1437       Positive  Negative 09/01/23 1418       Positive  Negative 06/15/23 1513    Cocaine Screen  Negative  Negative 10/06/23 1437       Negative  Negative 09/01/23 1418       Negative  Negative 06/15/23 1513    Propoxyphene Screen        Buprenorphine Screen        Methamphetamine Screen        Oxycodone Screen  Negative  Negative 10/06/23 1437       Negative  Negative 09/01/23 1418       Negative  Negative 06/15/23 1513    Tricyclic Antidepressants Screen                   PMHx:    Past Medical History:   Diagnosis Date    Anxiety     Depression     Hepatitis C        Medications:  prenatal  vitamin    Allergies:  No Known Allergies    PSHx:    Past Surgical History:   Procedure Laterality Date    TONSILLECTOMY         Social History:    reports that she has quit smoking. Her smoking use included cigarettes. She smoked an average of .5 packs per day. She does not have any smokeless tobacco history on file. She reports that she does not currently use drugs. She reports that she does not drink alcohol.    Family History: Non contributory    Immunizations: See prenatal record for Tdap, Flu, Covid and/or other vaccinations    PHYSICAL EXAM:     General- NAD, alert and oriented, appropriate  Psych- normal mood, good memory  Cardiovascular- Regular rhythm, no murnurs  Respiratory- CTA to bases, no wheezes  Abdomen- Gravid, non tender  Fundus-  Non tender.  Size: consistent with dates  EFW- 7 1/2 lbs, ultrasound 9/21 EFW 32%, 5 pounds 5 ounces   Pelvis-  Adequate   Cervix- 1cm / 50% / -3  Presentation- VTX  Extremities/DTRs- No edema, bilaterally equal, no signs of DVT    Fetal HR: Doptones 110-160, see last OV note  Contractions: Not complaining of any regular contractions      ASSESSMENT:  39w5d  Scheduled IOL  Late prenatal care with positive UDS for THC-follow-up UDS negative  Hepatitis C-elevated hep C RNA and normal LFTs in September-plan GI hepatitis clinic postpartum  Prepregnancy BMI 38, obesity in pregnancy  Lab Results   Component Value Date    STREPGPB No Group B Streptococcus isolated 09/29/2023       PLAN:  Admit  Delivery: IOL, cytotec and cervical catheter, then pitocin, OBGYN Hospitalist to admit at pts arrival and manage/deliver prn any emergencies until 0700.  Pt instructed to arrive at 0500    Plan of care [facility] hospital course, R/B/A/potential SE, suspected length <12-24+hrs have been reviewed with patient and any family or friends present, questions answered to her/his/their satisfaction.  Pt desires to proceed as above.    Counseling:The patient was counseled on the risks, benefits and  alternatives of Induction.  Risks reviewed, but are not limited to: bleeding, transfusion, fetal intolerance,  (possibly emergent),  and uterine rupture.  She declines expectant management or  and desires induction.  All her questions have been answered to her satisfaction and she desires to proceed.  Specifically with Cytotec, she understands that it is endorsed by the [employer] College of OB[employer]GY[employer], but not FDA approved for the induction of labor.          Electronically signed by Janene Clay DO, 10/25/23, 7:42 PM EDT.    [facility] ORDERS ONLY  [address]  [place] [address]  Dept: [phone]  Loc: [phone]

## 2023-10-26 ENCOUNTER — HOSPITAL ENCOUNTER (INPATIENT)
Dept: LABOR AND DELIVERY | Facility: HOSPITAL | Age: 33
Discharge: HOME | End: 2023-10-26
Payer: MEDICAID

## 2023-10-26 ENCOUNTER — PATIENT OUTREACH (OUTPATIENT)
Dept: LABOR AND DELIVERY | Facility: HOSPITAL | Age: 33
End: 2023-10-26
Payer: MEDICAID

## 2023-10-26 ENCOUNTER — ANESTHESIA (OUTPATIENT)
Dept: LABOR AND DELIVERY | Facility: HOSPITAL | Age: 33
End: 2023-10-26
Payer: MEDICAID

## 2023-10-26 ENCOUNTER — HOSPITAL ENCOUNTER (INPATIENT)
Facility: HOSPITAL | Age: 33
LOS: 3 days | Discharge: HOME | End: 2023-10-29
Attending: OBSTETRICS & GYNECOLOGY | Admitting: OBSTETRICS & GYNECOLOGY
Payer: MEDICAID

## 2023-10-26 ENCOUNTER — ANESTHESIA EVENT (OUTPATIENT)
Dept: LABOR AND DELIVERY | Facility: HOSPITAL | Age: 33
End: 2023-10-26
Payer: MEDICAID

## 2023-10-26 DIAGNOSIS — O98.419 CHRONIC HEPATITIS C COMPLICATING PREGNANCY, ANTEPARTUM: ICD-10-CM

## 2023-10-26 DIAGNOSIS — Z34.90 ENCOUNTER FOR INDUCTION OF LABOR: ICD-10-CM

## 2023-10-26 DIAGNOSIS — O09.30 LATE PRENATAL CARE AFFECTING PREGNANCY, ANTEPARTUM: ICD-10-CM

## 2023-10-26 DIAGNOSIS — B18.2 CHRONIC HEPATITIS C COMPLICATING PREGNANCY, ANTEPARTUM: ICD-10-CM

## 2023-10-26 LAB
ABO GROUP BLD: NORMAL
ALBUMIN SERPL-MCNC: 2.9 G/DL (ref 3.5–5.2)
ALBUMIN/GLOB SERPL: 0.8 G/DL
ALP SERPL-CCNC: 130 U/L (ref 39–117)
ALT SERPL W P-5'-P-CCNC: 31 U/L (ref 1–33)
AMPHET+METHAMPHET UR QL: NEGATIVE
ANION GAP SERPL CALCULATED.3IONS-SCNC: 8.8 MMOL/L (ref 5–15)
AST SERPL-CCNC: 37 U/L (ref 1–32)
BARBITURATES UR QL SCN: NEGATIVE
BENZODIAZ UR QL SCN: NEGATIVE
BILIRUB SERPL-MCNC: 0.4 MG/DL (ref 0–1.2)
BLD GP AB SCN SERPL QL: NEGATIVE
BUN SERPL-MCNC: 6 MG/DL (ref 6–20)
BUN/CREAT SERPL: 9.5 (ref 7–25)
CALCIUM SPEC-SCNC: 9.1 MG/DL (ref 8.6–10.5)
CANNABINOIDS SERPL QL: NEGATIVE
CHLORIDE SERPL-SCNC: 101 MMOL/L (ref 98–107)
CO2 SERPL-SCNC: 23.2 MMOL/L (ref 22–29)
COCAINE UR QL: NEGATIVE
CREAT SERPL-MCNC: 0.63 MG/DL (ref 0.57–1)
DEPRECATED RDW RBC AUTO: 43.2 FL (ref 37–54)
EGFRCR SERPLBLD CKD-EPI 2021: 121.8 ML/MIN/1.73
ERYTHROCYTE [DISTWIDTH] IN BLOOD BY AUTOMATED COUNT: 14.9 % (ref 12.3–15.4)
FENTANYL UR-MCNC: NEGATIVE NG/ML
GLOBULIN UR ELPH-MCNC: 3.8 GM/DL
GLUCOSE SERPL-MCNC: 87 MG/DL (ref 65–99)
HCT VFR BLD AUTO: 31.5 % (ref 34–46.6)
HGB BLD-MCNC: 10 G/DL (ref 12–15.9)
MCH RBC QN AUTO: 25.9 PG (ref 26.6–33)
MCHC RBC AUTO-ENTMCNC: 31.7 G/DL (ref 31.5–35.7)
MCV RBC AUTO: 81.6 FL (ref 79–97)
METHADONE UR QL SCN: NEGATIVE
OPIATES UR QL: NEGATIVE
OXYCODONE UR QL SCN: NEGATIVE
PLATELET # BLD AUTO: 229 10*3/MM3 (ref 140–450)
PMV BLD AUTO: 10.4 FL (ref 6–12)
POTASSIUM SERPL-SCNC: 3.9 MMOL/L (ref 3.5–5.2)
PROT SERPL-MCNC: 6.7 G/DL (ref 6–8.5)
RBC # BLD AUTO: 3.86 10*6/MM3 (ref 3.77–5.28)
RH BLD: NEGATIVE
SODIUM SERPL-SCNC: 133 MMOL/L (ref 136–145)
T&S EXPIRATION DATE: NORMAL
WBC NRBC COR # BLD: 10.93 10*3/MM3 (ref 3.4–10.8)

## 2023-10-26 PROCEDURE — 86900 BLOOD TYPING SEROLOGIC ABO: CPT | Performed by: OBSTETRICS & GYNECOLOGY

## 2023-10-26 PROCEDURE — 80053 COMPREHEN METABOLIC PANEL: CPT | Performed by: OBSTETRICS & GYNECOLOGY

## 2023-10-26 PROCEDURE — 86901 BLOOD TYPING SEROLOGIC RH(D): CPT | Performed by: OBSTETRICS & GYNECOLOGY

## 2023-10-26 PROCEDURE — 25810000003 LACTATED RINGERS SOLUTION: Performed by: OBSTETRICS & GYNECOLOGY

## 2023-10-26 PROCEDURE — 25010000002 ONDANSETRON PER 1 MG: Performed by: OBSTETRICS & GYNECOLOGY

## 2023-10-26 PROCEDURE — 80307 DRUG TEST PRSMV CHEM ANLYZR: CPT | Performed by: OBSTETRICS & GYNECOLOGY

## 2023-10-26 PROCEDURE — 25010000002 FENTANYL CITRATE (PF) 50 MCG/ML SOLUTION: Performed by: ANESTHESIOLOGY

## 2023-10-26 PROCEDURE — 25810000003 LACTATED RINGERS PER 1000 ML: Performed by: OBSTETRICS & GYNECOLOGY

## 2023-10-26 PROCEDURE — C1755 CATHETER, INTRASPINAL: HCPCS | Performed by: ANESTHESIOLOGY

## 2023-10-26 PROCEDURE — 86850 RBC ANTIBODY SCREEN: CPT | Performed by: OBSTETRICS & GYNECOLOGY

## 2023-10-26 PROCEDURE — 82803 BLOOD GASES ANY COMBINATION: CPT | Performed by: OBSTETRICS & GYNECOLOGY

## 2023-10-26 PROCEDURE — 25810000003 LACTATED RINGERS SOLUTION: Performed by: ANESTHESIOLOGY

## 2023-10-26 PROCEDURE — 85027 COMPLETE CBC AUTOMATED: CPT | Performed by: OBSTETRICS & GYNECOLOGY

## 2023-10-26 RX ORDER — PROMETHAZINE HYDROCHLORIDE 12.5 MG/1
12.5 TABLET ORAL EVERY 6 HOURS PRN
Status: DISCONTINUED | OUTPATIENT
Start: 2023-10-26 | End: 2023-10-27 | Stop reason: HOSPADM

## 2023-10-26 RX ORDER — OXYTOCIN/0.9 % SODIUM CHLORIDE 30/500 ML
999 PLASTIC BAG, INJECTION (ML) INTRAVENOUS ONCE
Status: COMPLETED | OUTPATIENT
Start: 2023-10-26 | End: 2023-10-27

## 2023-10-26 RX ORDER — OXYTOCIN/0.9 % SODIUM CHLORIDE 30/500 ML
250 PLASTIC BAG, INJECTION (ML) INTRAVENOUS CONTINUOUS
Status: ACTIVE | OUTPATIENT
Start: 2023-10-26 | End: 2023-10-26

## 2023-10-26 RX ORDER — FENTANYL CITRATE 50 UG/ML
INJECTION, SOLUTION INTRAMUSCULAR; INTRAVENOUS
Status: COMPLETED | OUTPATIENT
Start: 2023-10-26 | End: 2023-10-26

## 2023-10-26 RX ORDER — ACETAMINOPHEN 325 MG/1
650 TABLET ORAL ONCE AS NEEDED
Status: COMPLETED | OUTPATIENT
Start: 2023-10-26 | End: 2023-10-27

## 2023-10-26 RX ORDER — LIDOCAINE HYDROCHLORIDE 20 MG/ML
INJECTION, SOLUTION EPIDURAL; INFILTRATION; INTRACAUDAL; PERINEURAL
Status: COMPLETED | OUTPATIENT
Start: 2023-10-26 | End: 2023-10-26

## 2023-10-26 RX ORDER — CEFAZOLIN SODIUM 2 G/100ML
2000 INJECTION, SOLUTION INTRAVENOUS ONCE AS NEEDED
Status: DISCONTINUED | OUTPATIENT
Start: 2023-10-26 | End: 2023-10-27 | Stop reason: HOSPADM

## 2023-10-26 RX ORDER — FAMOTIDINE 10 MG/ML
20 INJECTION, SOLUTION INTRAVENOUS 2 TIMES DAILY PRN
Status: DISCONTINUED | OUTPATIENT
Start: 2023-10-26 | End: 2023-10-27 | Stop reason: HOSPADM

## 2023-10-26 RX ORDER — SODIUM CHLORIDE 0.9 % (FLUSH) 0.9 %
10 SYRINGE (ML) INJECTION AS NEEDED
Status: DISCONTINUED | OUTPATIENT
Start: 2023-10-26 | End: 2023-10-27 | Stop reason: HOSPADM

## 2023-10-26 RX ORDER — LIDOCAINE HCL/EPINEPHRINE/PF 2%-1:200K
VIAL (ML) INJECTION
Status: DISPENSED
Start: 2023-10-26 | End: 2023-10-26

## 2023-10-26 RX ORDER — SODIUM CHLORIDE 0.9 % (FLUSH) 0.9 %
10 SYRINGE (ML) INJECTION EVERY 12 HOURS SCHEDULED
Status: DISCONTINUED | OUTPATIENT
Start: 2023-10-26 | End: 2023-10-27 | Stop reason: HOSPADM

## 2023-10-26 RX ORDER — PROMETHAZINE HYDROCHLORIDE 25 MG/1
25 TABLET ORAL EVERY 6 HOURS PRN
Status: DISCONTINUED | OUTPATIENT
Start: 2023-10-26 | End: 2023-10-27 | Stop reason: HOSPADM

## 2023-10-26 RX ORDER — ONDANSETRON 2 MG/ML
4 INJECTION INTRAMUSCULAR; INTRAVENOUS EVERY 6 HOURS PRN
Status: DISCONTINUED | OUTPATIENT
Start: 2023-10-26 | End: 2023-10-27 | Stop reason: HOSPADM

## 2023-10-26 RX ORDER — CITRIC ACID/SODIUM CITRATE 334-500MG
30 SOLUTION, ORAL ORAL ONCE AS NEEDED
Status: DISCONTINUED | OUTPATIENT
Start: 2023-10-26 | End: 2023-10-27 | Stop reason: HOSPADM

## 2023-10-26 RX ORDER — SODIUM CHLORIDE, SODIUM LACTATE, POTASSIUM CHLORIDE, CALCIUM CHLORIDE 600; 310; 30; 20 MG/100ML; MG/100ML; MG/100ML; MG/100ML
125 INJECTION, SOLUTION INTRAVENOUS CONTINUOUS
Status: DISCONTINUED | OUTPATIENT
Start: 2023-10-26 | End: 2023-10-27

## 2023-10-26 RX ORDER — METOCLOPRAMIDE HYDROCHLORIDE 5 MG/ML
10 INJECTION INTRAMUSCULAR; INTRAVENOUS ONCE AS NEEDED
Status: DISCONTINUED | OUTPATIENT
Start: 2023-10-26 | End: 2023-10-27 | Stop reason: HOSPADM

## 2023-10-26 RX ORDER — TERBUTALINE SULFATE 1 MG/ML
0.25 INJECTION, SOLUTION SUBCUTANEOUS AS NEEDED
Status: DISCONTINUED | OUTPATIENT
Start: 2023-10-26 | End: 2023-10-27 | Stop reason: HOSPADM

## 2023-10-26 RX ORDER — MAGNESIUM CARB/ALUMINUM HYDROX 105-160MG
TABLET,CHEWABLE ORAL
Status: DISCONTINUED
Start: 2023-10-26 | End: 2023-10-27 | Stop reason: WASHOUT

## 2023-10-26 RX ORDER — FAMOTIDINE 20 MG/1
20 TABLET, FILM COATED ORAL 2 TIMES DAILY PRN
Status: DISCONTINUED | OUTPATIENT
Start: 2023-10-26 | End: 2023-10-27 | Stop reason: HOSPADM

## 2023-10-26 RX ORDER — EPHEDRINE SULFATE 50 MG/ML
5 INJECTION, SOLUTION INTRAVENOUS
Status: COMPLETED | OUTPATIENT
Start: 2023-10-26 | End: 2023-10-26

## 2023-10-26 RX ORDER — IBUPROFEN 800 MG/1
800 TABLET ORAL ONCE AS NEEDED
Status: DISCONTINUED | OUTPATIENT
Start: 2023-10-26 | End: 2023-10-27 | Stop reason: HOSPADM

## 2023-10-26 RX ORDER — FAMOTIDINE 20 MG/1
20 TABLET, FILM COATED ORAL ONCE AS NEEDED
Status: DISCONTINUED | OUTPATIENT
Start: 2023-10-26 | End: 2023-10-27 | Stop reason: HOSPADM

## 2023-10-26 RX ORDER — ONDANSETRON 4 MG/1
4 TABLET, FILM COATED ORAL EVERY 6 HOURS PRN
Status: DISCONTINUED | OUTPATIENT
Start: 2023-10-26 | End: 2023-10-27 | Stop reason: HOSPADM

## 2023-10-26 RX ORDER — HYDROCODONE BITARTRATE AND ACETAMINOPHEN 5; 325 MG/1; MG/1
1 TABLET ORAL EVERY 4 HOURS PRN
Status: DISCONTINUED | OUTPATIENT
Start: 2023-10-26 | End: 2023-10-27 | Stop reason: HOSPADM

## 2023-10-26 RX ORDER — SODIUM CHLORIDE 9 MG/ML
40 INJECTION, SOLUTION INTRAVENOUS AS NEEDED
Status: DISCONTINUED | OUTPATIENT
Start: 2023-10-26 | End: 2023-10-27 | Stop reason: HOSPADM

## 2023-10-26 RX ORDER — MISOPROSTOL 200 UG/1
800 TABLET ORAL AS NEEDED
Status: DISCONTINUED | OUTPATIENT
Start: 2023-10-26 | End: 2023-10-27 | Stop reason: HOSPADM

## 2023-10-26 RX ORDER — LIDOCAINE HYDROCHLORIDE AND EPINEPHRINE 15; 5 MG/ML; UG/ML
INJECTION, SOLUTION EPIDURAL
Status: COMPLETED | OUTPATIENT
Start: 2023-10-26 | End: 2023-10-26

## 2023-10-26 RX ORDER — MAGNESIUM CARB/ALUMINUM HYDROX 105-160MG
30 TABLET,CHEWABLE ORAL ONCE
Status: COMPLETED | OUTPATIENT
Start: 2023-10-26 | End: 2023-10-26

## 2023-10-26 RX ORDER — FENTANYL CITRATE 50 UG/ML
INJECTION, SOLUTION INTRAMUSCULAR; INTRAVENOUS
Status: COMPLETED
Start: 2023-10-26 | End: 2023-10-26

## 2023-10-26 RX ORDER — MISOPROSTOL 100 MCG
25 TABLET ORAL ONCE
Status: COMPLETED | OUTPATIENT
Start: 2023-10-26 | End: 2023-10-26

## 2023-10-26 RX ORDER — FAMOTIDINE 10 MG/ML
20 INJECTION, SOLUTION INTRAVENOUS ONCE AS NEEDED
Status: DISCONTINUED | OUTPATIENT
Start: 2023-10-26 | End: 2023-10-27 | Stop reason: HOSPADM

## 2023-10-26 RX ORDER — MORPHINE SULFATE 5 MG/ML
5 INJECTION, SOLUTION INTRAMUSCULAR; INTRAVENOUS
Status: DISCONTINUED | OUTPATIENT
Start: 2023-10-26 | End: 2023-10-27 | Stop reason: HOSPADM

## 2023-10-26 RX ORDER — HYDROCODONE BITARTRATE AND ACETAMINOPHEN 10; 325 MG/1; MG/1
1 TABLET ORAL EVERY 4 HOURS PRN
Status: DISCONTINUED | OUTPATIENT
Start: 2023-10-26 | End: 2023-10-27 | Stop reason: HOSPADM

## 2023-10-26 RX ORDER — LIDOCAINE HYDROCHLORIDE 10 MG/ML
0.5 INJECTION, SOLUTION INFILTRATION; PERINEURAL ONCE AS NEEDED
Status: DISCONTINUED | OUTPATIENT
Start: 2023-10-26 | End: 2023-10-27 | Stop reason: HOSPADM

## 2023-10-26 RX ORDER — METHYLERGONOVINE MALEATE 0.2 MG/ML
200 INJECTION INTRAVENOUS ONCE AS NEEDED
Status: DISCONTINUED | OUTPATIENT
Start: 2023-10-26 | End: 2023-10-27 | Stop reason: HOSPADM

## 2023-10-26 RX ORDER — OXYTOCIN/0.9 % SODIUM CHLORIDE 30/500 ML
0-12 PLASTIC BAG, INJECTION (ML) INTRAVENOUS CONTINUOUS PRN
Status: DISCONTINUED | OUTPATIENT
Start: 2023-10-26 | End: 2023-10-27 | Stop reason: HOSPADM

## 2023-10-26 RX ORDER — ACETAMINOPHEN 325 MG/1
650 TABLET ORAL EVERY 4 HOURS PRN
Status: DISCONTINUED | OUTPATIENT
Start: 2023-10-26 | End: 2023-10-27 | Stop reason: HOSPADM

## 2023-10-26 RX ADMIN — LIDOCAINE HYDROCHLORIDE 3 ML: 20 INJECTION, SOLUTION EPIDURAL; INFILTRATION; INTRACAUDAL; PERINEURAL at 09:47

## 2023-10-26 RX ADMIN — FAMOTIDINE 20 MG: 20 TABLET ORAL at 15:35

## 2023-10-26 RX ADMIN — EPHEDRINE SULFATE 5 MG: 50 INJECTION INTRAVENOUS at 19:35

## 2023-10-26 RX ADMIN — SODIUM CHLORIDE, POTASSIUM CHLORIDE, SODIUM LACTATE AND CALCIUM CHLORIDE 1000 ML: 600; 310; 30; 20 INJECTION, SOLUTION INTRAVENOUS at 09:30

## 2023-10-26 RX ADMIN — Medication 10 ML/HR: at 17:01

## 2023-10-26 RX ADMIN — SODIUM CHLORIDE, POTASSIUM CHLORIDE, SODIUM LACTATE AND CALCIUM CHLORIDE 125 ML/HR: 600; 310; 30; 20 INJECTION, SOLUTION INTRAVENOUS at 21:19

## 2023-10-26 RX ADMIN — SODIUM CHLORIDE, POTASSIUM CHLORIDE, SODIUM LACTATE AND CALCIUM CHLORIDE 1000 ML: 600; 310; 30; 20 INJECTION, SOLUTION INTRAVENOUS at 12:03

## 2023-10-26 RX ADMIN — Medication 25 MCG: at 07:45

## 2023-10-26 RX ADMIN — SODIUM CHLORIDE, POTASSIUM CHLORIDE, SODIUM LACTATE AND CALCIUM CHLORIDE 125 ML/HR: 600; 310; 30; 20 INJECTION, SOLUTION INTRAVENOUS at 14:13

## 2023-10-26 RX ADMIN — SODIUM CHLORIDE, POTASSIUM CHLORIDE, SODIUM LACTATE AND CALCIUM CHLORIDE 125 ML/HR: 600; 310; 30; 20 INJECTION, SOLUTION INTRAVENOUS at 06:35

## 2023-10-26 RX ADMIN — Medication 10 ML/HR: at 09:52

## 2023-10-26 RX ADMIN — FENTANYL CITRATE 100 MCG: 50 INJECTION, SOLUTION INTRAMUSCULAR; INTRAVENOUS at 09:52

## 2023-10-26 RX ADMIN — MINERAL OIL 30 ML: 1000 SOLUTION ORAL at 07:33

## 2023-10-26 RX ADMIN — Medication 1 MILLI-UNITS/MIN: at 11:40

## 2023-10-26 RX ADMIN — ONDANSETRON 4 MG: 2 INJECTION INTRAMUSCULAR; INTRAVENOUS at 18:38

## 2023-10-26 RX ADMIN — EPHEDRINE SULFATE 5 MG: 50 INJECTION INTRAVENOUS at 12:02

## 2023-10-26 RX ADMIN — LIDOCAINE HYDROCHLORIDE AND EPINEPHRINE 3 ML: 15; 5 INJECTION, SOLUTION EPIDURAL at 13:56

## 2023-10-26 RX ADMIN — SODIUM CHLORIDE, POTASSIUM CHLORIDE, SODIUM LACTATE AND CALCIUM CHLORIDE 125 ML/HR: 600; 310; 30; 20 INJECTION, SOLUTION INTRAVENOUS at 09:59

## 2023-10-26 RX ADMIN — EPHEDRINE SULFATE 5 MG: 50 INJECTION INTRAVENOUS at 18:47

## 2023-10-26 NOTE — PROGRESS NOTES
OB Intrapartum Note    Subjective: Pain controlled    Objective:  Baseline: Normal 110-160 bpm  Variability:   Moderate/Normal (amplitude 6-25 bpm)  Accelerations: Present (32 weeks+) 15 x 15 bpm  Decelerations: None  Contractions:  Regular, q2min, Pitocin at 2milliunits/min    Cervical exam:    Dilation: 2-3cm    Effacement: 50%    Station: -3    Impression:    Category I  Reassuring fetus, Cervical cath placed 80/80    Plan:   Continue pitocin  Continue to monitor  Active labor positioning  Reviewed course/progress/plan with pt, questions answered to pt satisfaction, pt desires to proceed as outlined.        Electronically signed by Janene Clay DO, 10/26/23, 3:14 PM EDT.

## 2023-10-26 NOTE — ANESTHESIA PROCEDURE NOTES
Labor Epidural      Patient reassessed immediately prior to procedure    Patient location during procedure: OB  Preanesthetic Checklist  Completed: patient identified, IV checked, risks and benefits discussed, surgical consent, monitors and equipment checked, pre-op evaluation and timeout performed  Additional Notes  Called to see pt with increased labor pain.  Noted iv epidural cath, so replaced LE  Prep:  Pt Position:sitting  Sterile Tech:cap, gloves, sterile barrier, mask and gown  Prep:chlorhexidine gluconate and isopropyl alcohol  Monitoring:blood pressure monitoring, continuous pulse oximetry and EKG  Epidural Block Procedure:  Approach:midline  Guidance:landmark technique and palpation technique  Location:L3-L4  Needle Type:Tuohy  Needle Gauge:17 G  Loss of Resistance Medium: air  Loss of Resistance: 6cm  Cath Depth at skin:11 cm  Paresthesia: none  Aspiration:negative  Test Dose:negative  Medication: lidocaine 1.5%-EPINEPHrine 1:200,000 (XYLOCAINE W/EPI) injection - Epidural   3 mL - 10/26/2023 1:56:00 PM  Number of Attempts: 1  Post Assessment:  Dressing:occlusive dressing applied and secured with tape  Pt Tolerance:patient tolerated the procedure well with no apparent complications  Complications:no

## 2023-10-26 NOTE — OUTREACH NOTE
[facility]  IP Antepartum    Current Estimated Gestational Age: 39w6d      Questions/Answers      Flowsheet Row Responses   Best Method for Contacting Cell   Support Person Present Yes   Community Resources/Benefits currently in use: WIC   Understanding of diagnosis/reason for admission: Patient understands, no questions or concerns at this time            Met with patient in labor and delivery. Here for scheduled induction. Pediatrician list given. No other questions, needs or concerns.       Rosalind Hensley RN  Maternity Nurse Navigator    10/26/2023, 11:28 EDT

## 2023-10-26 NOTE — ANESTHESIA PROCEDURE NOTES
Labor Epidural      Patient reassessed immediately prior to procedure    Patient location during procedure: OB  Preanesthetic Checklist  Completed: patient identified, IV checked, risks and benefits discussed, surgical consent, monitors and equipment checked, pre-op evaluation and timeout performed  Prep:  Pt Position:sitting  Sterile Tech:cap, gloves, sterile barrier, mask and gown  Prep:chlorhexidine gluconate and isopropyl alcohol  Monitoring:blood pressure monitoring, continuous pulse oximetry and EKG  Epidural Block Procedure:  Approach:midline  Guidance:landmark technique and palpation technique  Location:L3-L4  Needle Type:Tuohy  Needle Gauge:17 G  Loss of Resistance Medium: air  Loss of Resistance: 7cm  Cath Depth at skin:12 cm  Paresthesia: none  Aspiration:negative  Test Dose:negative  Medication: fentaNYL citrate (PF) (SUBLIMAZE) injection - Epidural   100 mcg - 10/26/2023 9:52:00 AM  lidocaine PF 2% (XYLOCAINE) injection - Epidural   3 mL - 10/26/2023 9:47:00 AM  Number of Attempts: 1  Post Assessment:  Dressing:occlusive dressing applied and secured with tape  Pt Tolerance:patient tolerated the procedure well with no apparent complications  Complications:no

## 2023-10-26 NOTE — PROGRESS NOTES
OB Intrapartum Note    Subjective: No complaints, Pain controlled, cytotec 0745, 25mcg PV    Objective:  Baseline: Normal 110-160 bpm  Variability:   Moderate/Normal (amplitude 6-25 bpm)  Accelerations: Present (32 weeks+) 15 x 15 bpm  Decelerations: None  Contractions:  Irregular    Cervical exam:    Dilation: 1cm    Effacement: 50%    Station: -3, VTX, unable to josé miguel placement of 80/80 desires epidural first    Impression:    Category I  Reassuring fetus    Plan:   Continue cervical ripening  Continue to monitor  Epidural  Pelvis feels clinically adequate  I have discussed with patient and family (if present) the current plan to include, but NLT appropriate expectations, to include possible length of time before delivery/hospital stay.  All questions have been answered to their satisfaction and they desire to proceed as noted.  Pt had extremely bad experience w cath last preg/del, very anxious, very worried about same pain again, unable to relax.  Will plan epidural and place 80/80 cvx cath. Pt is in agreement, questions answered.         Electronically signed by Janene Clay DO, 10/26/23, 9:12 AM EDT.

## 2023-10-26 NOTE — PROGRESS NOTES
OB Intrapartum Note    Subjective: No complaints, Pain not controlled, Epidural- but still feels vag exams, perez cath, can move legs and hips    Objective:  Baseline: Normal 110-160 bpm  Variability:   Moderate/Normal (amplitude 6-25 bpm)  Accelerations: Present (32 weeks+) 15 x 15 bpm  Decelerations: None  Contractions:  Regular, q3min    Cervical exam:    Dilation: 2cm    Effacement: 50%    Station: -3    Impression:    Category I  Reassuring fetus, Adequate progress, Pain not controlled    Plan:   Start pitocin  Active labor positioning  Rec anethesia eval for pain control        Electronically signed by Janene Clay DO, 10/26/23, 11:16 AM EDT.

## 2023-10-26 NOTE — PROGRESS NOTES
OB Intrapartum Note    Subjective: No complaints, Pain controlled, Comfortable with epidural    Objective:  Baseline: Normal 110-160 bpm  Variability:   Moderate/Normal (amplitude 6-25 bpm)  Accelerations: Present (32 weeks+) 15 x 15 bpm  Decelerations: None  Contractions:  Regular, q2min, Pitocin at 7milliunits/min    Cervical exam:    Dilation: 5cm    Effacement: 60%    Station: -1    Impression:    Category I  Reassuring fetus, Adequate progress, AROM, Clear fluid, lightly blood tinged    Plan:   Continue pitocin  Continue to monitor  Active labor positioning  Pelvis feels clinically adequate  Reviewed course/progress/plan with pt.  All questions answered to pts satisfaction.  Pt desires to proceed as outlined.        Electronically signed by Janene Clay DO, 10/26/23, 6:21 PM EDT.

## 2023-10-26 NOTE — ANESTHESIA PREPROCEDURE EVALUATION
Anesthesia Evaluation     Patient summary reviewed and Nursing notes reviewed   no history of anesthetic complications:   NPO Solid Status: > 8 hours  NPO Liquid Status: > 2 hours           Airway   Mallampati: II  TM distance: >3 FB  Neck ROM: full  No difficulty expected  Dental      Pulmonary - negative pulmonary ROS and normal exam    breath sounds clear to auscultation  Cardiovascular - negative cardio ROS and normal exam  Exercise tolerance: good (4-7 METS)    Rhythm: regular  Rate: normal        Neuro/Psych  (+) psychiatric history Anxiety  GI/Hepatic/Renal/Endo    (+) obesity, hepatitis C, liver disease    Musculoskeletal (-) negative ROS    Abdominal    Substance History - negative use     OB/GYN    (+) Pregnant        Other - negative ROS       ROS/Med Hx Other:                Anesthesia Plan    ASA 3     epidural       Anesthetic plan, risks, benefits, and alternatives have been provided, discussed and informed consent has been obtained with: patient.  Pre-procedure education provided    CODE STATUS:    Code Status (Patient has no pulse and is not breathing): CPR (Attempt to Resuscitate)  Medical Interventions (Patient has pulse or is breathing): Full      
Yes

## 2023-10-27 LAB
ABO GROUP BLD: NORMAL
BASE EXCESS BLDCOA CALC-SCNC: -9.3 MMOL/L (ref -2–2)
BASE EXCESS BLDCOV CALC-SCNC: -6 MMOL/L (ref -2–2)
BLD GP AB SCN SERPL QL: NEGATIVE
FETAL BLEED: NEGATIVE
HCO3 BLDCOA-SCNC: 20.3 MMOL/L
HCO3 BLDCOV-SCNC: 20.3 MMOL/L
NUMBER OF DOSES: ABNORMAL
PCO2 BLDCOA: 60 MMHG (ref 33–49)
PCO2 BLDCOV: 42.4 MM HG (ref 28–40)
PH BLDCOA: 7.15 PH UNITS (ref 7.21–7.31)
PH BLDCOV: 7.3 PH UNITS (ref 7.31–7.37)
PO2 BLDCOA: 13.8 MMHG
PO2 BLDCOV: 18.6 MM HG (ref 21–31)
RH BLD: NEGATIVE

## 2023-10-27 PROCEDURE — 85461 HEMOGLOBIN FETAL: CPT | Performed by: OBSTETRICS & GYNECOLOGY

## 2023-10-27 PROCEDURE — 59409 OBSTETRICAL CARE: CPT | Performed by: OBSTETRICS & GYNECOLOGY

## 2023-10-27 PROCEDURE — 25010000002 RHO D IMMUNE GLOBULIN 1500 UNIT/2ML SOLUTION PREFILLED SYRINGE: Performed by: OBSTETRICS & GYNECOLOGY

## 2023-10-27 PROCEDURE — 86901 BLOOD TYPING SEROLOGIC RH(D): CPT | Performed by: OBSTETRICS & GYNECOLOGY

## 2023-10-27 PROCEDURE — 86900 BLOOD TYPING SEROLOGIC ABO: CPT | Performed by: OBSTETRICS & GYNECOLOGY

## 2023-10-27 PROCEDURE — 0HQ9XZZ REPAIR PERINEUM SKIN, EXTERNAL APPROACH: ICD-10-PCS | Performed by: OBSTETRICS & GYNECOLOGY

## 2023-10-27 PROCEDURE — 59025 FETAL NON-STRESS TEST: CPT

## 2023-10-27 PROCEDURE — 51702 INSERT TEMP BLADDER CATH: CPT

## 2023-10-27 RX ORDER — PROMETHAZINE HYDROCHLORIDE 12.5 MG/1
12.5 TABLET ORAL EVERY 4 HOURS PRN
Status: DISCONTINUED | OUTPATIENT
Start: 2023-10-27 | End: 2023-10-29 | Stop reason: HOSPADM

## 2023-10-27 RX ORDER — DIAZEPAM 5 MG/1
5 TABLET ORAL ONCE
Status: COMPLETED | OUTPATIENT
Start: 2023-10-27 | End: 2023-10-27

## 2023-10-27 RX ORDER — IBUPROFEN 800 MG/1
800 TABLET ORAL EVERY 8 HOURS PRN
Qty: 30 TABLET | Refills: 1 | Status: SHIPPED | OUTPATIENT
Start: 2023-10-27

## 2023-10-27 RX ORDER — OXYTOCIN/0.9 % SODIUM CHLORIDE 30/500 ML
125 PLASTIC BAG, INJECTION (ML) INTRAVENOUS CONTINUOUS PRN
Status: DISCONTINUED | OUTPATIENT
Start: 2023-10-27 | End: 2023-10-29 | Stop reason: HOSPADM

## 2023-10-27 RX ORDER — ONDANSETRON 4 MG/1
4 TABLET, FILM COATED ORAL EVERY 8 HOURS PRN
Status: DISCONTINUED | OUTPATIENT
Start: 2023-10-27 | End: 2023-10-29 | Stop reason: HOSPADM

## 2023-10-27 RX ORDER — ACETAMINOPHEN 325 MG/1
650 TABLET ORAL EVERY 6 HOURS PRN
Qty: 30 TABLET | Refills: 1 | Status: SHIPPED | OUTPATIENT
Start: 2023-10-27

## 2023-10-27 RX ORDER — CALCIUM CARBONATE 500 MG/1
2 TABLET, CHEWABLE ORAL 3 TIMES DAILY PRN
Status: DISCONTINUED | OUTPATIENT
Start: 2023-10-27 | End: 2023-10-29 | Stop reason: HOSPADM

## 2023-10-27 RX ORDER — MEPERIDINE HYDROCHLORIDE 25 MG/ML
12.5 INJECTION INTRAMUSCULAR; INTRAVENOUS; SUBCUTANEOUS ONCE
Status: DISPENSED | OUTPATIENT
Start: 2023-10-27 | End: 2023-10-29

## 2023-10-27 RX ORDER — TRAMADOL HYDROCHLORIDE 50 MG/1
50 TABLET ORAL EVERY 6 HOURS PRN
Status: DISCONTINUED | OUTPATIENT
Start: 2023-10-27 | End: 2023-10-29 | Stop reason: HOSPADM

## 2023-10-27 RX ORDER — IBUPROFEN 600 MG/1
600 TABLET ORAL EVERY 6 HOURS SCHEDULED
Status: DISCONTINUED | OUTPATIENT
Start: 2023-10-27 | End: 2023-10-29 | Stop reason: HOSPADM

## 2023-10-27 RX ORDER — POLYETHYLENE GLYCOL 3350 17 G/17G
17 POWDER, FOR SOLUTION ORAL DAILY PRN
Status: DISCONTINUED | OUTPATIENT
Start: 2023-10-27 | End: 2023-10-29 | Stop reason: HOSPADM

## 2023-10-27 RX ORDER — ACETAMINOPHEN 325 MG/1
650 TABLET ORAL EVERY 6 HOURS
Status: DISCONTINUED | OUTPATIENT
Start: 2023-10-27 | End: 2023-10-29 | Stop reason: HOSPADM

## 2023-10-27 RX ORDER — ACETAMINOPHEN 325 MG/1
650 TABLET ORAL EVERY 6 HOURS
Status: DISCONTINUED | OUTPATIENT
Start: 2023-10-27 | End: 2023-10-27

## 2023-10-27 RX ORDER — DOCUSATE SODIUM 100 MG/1
100 CAPSULE, LIQUID FILLED ORAL DAILY
Status: DISCONTINUED | OUTPATIENT
Start: 2023-10-27 | End: 2023-10-29 | Stop reason: HOSPADM

## 2023-10-27 RX ORDER — SODIUM CHLORIDE 0.9 % (FLUSH) 0.9 %
1-10 SYRINGE (ML) INJECTION AS NEEDED
Status: DISCONTINUED | OUTPATIENT
Start: 2023-10-27 | End: 2023-10-29 | Stop reason: HOSPADM

## 2023-10-27 RX ADMIN — WITCH HAZEL: 500 SOLUTION RECTAL; TOPICAL at 03:10

## 2023-10-27 RX ADMIN — WITCH HAZEL: 500 SOLUTION RECTAL; TOPICAL at 09:30

## 2023-10-27 RX ADMIN — DIAZEPAM 5 MG: 5 TABLET ORAL at 02:05

## 2023-10-27 RX ADMIN — TRAMADOL HYDROCHLORIDE 50 MG: 50 TABLET, COATED ORAL at 22:22

## 2023-10-27 RX ADMIN — Medication 999 ML/HR: at 00:28

## 2023-10-27 RX ADMIN — Medication: at 03:10

## 2023-10-27 RX ADMIN — TRAMADOL HYDROCHLORIDE 50 MG: 50 TABLET, COATED ORAL at 09:30

## 2023-10-27 RX ADMIN — ACETAMINOPHEN 650 MG: 325 TABLET ORAL at 02:40

## 2023-10-27 RX ADMIN — DOCUSATE SODIUM 100 MG: 100 CAPSULE, LIQUID FILLED ORAL at 09:26

## 2023-10-27 RX ADMIN — ACETAMINOPHEN 650 MG: 325 TABLET ORAL at 14:58

## 2023-10-27 RX ADMIN — MISOPROSTOL 400 MCG: 200 TABLET ORAL at 00:42

## 2023-10-27 RX ADMIN — IBUPROFEN 600 MG: 600 TABLET, FILM COATED ORAL at 06:44

## 2023-10-27 RX ADMIN — IBUPROFEN 600 MG: 600 TABLET, FILM COATED ORAL at 12:06

## 2023-10-27 RX ADMIN — TRAMADOL HYDROCHLORIDE 50 MG: 50 TABLET, COATED ORAL at 14:58

## 2023-10-27 RX ADMIN — IBUPROFEN 600 MG: 600 TABLET, FILM COATED ORAL at 18:04

## 2023-10-27 RX ADMIN — ACETAMINOPHEN 650 MG: 325 TABLET ORAL at 21:46

## 2023-10-27 RX ADMIN — IBUPROFEN 600 MG: 600 TABLET, FILM COATED ORAL at 23:02

## 2023-10-27 RX ADMIN — ACETAMINOPHEN 650 MG: 325 TABLET ORAL at 09:26

## 2023-10-27 RX ADMIN — HUMAN RHO(D) IMMUNE GLOBULIN 1500 UNITS: 1500 SOLUTION INTRAMUSCULAR; INTRAVENOUS at 15:02

## 2023-10-27 NOTE — PROGRESS NOTES
PostPartum/PostOp PROGRESS NOTE        Subjective:  Patient has no complaints  Pain controlled  Ambulating  Urinating spontaneously  Lochia decreasing, no bleeding concerns  Baby in NICU      Objective:     Temp:  [97.9 °F (36.6 °C)-99.8 °F (37.7 °C)] 98.8 °F (37.1 °C)  Heart Rate:  [] 89  Resp:  [16-20] 18  BP: ()/(31-90) 103/44  General- NAD, alert and oriented, appropriate  Psych- Normal mood, good memory  Cardiovascular/Respiratory- Not examined  Abdomen- Fundus firm, non tender and Fundus at U-1  Extremties/DTRs- No edema, bilaterally equal, no signs of DVT    Results from last 7 days   Lab Units 10/26/23  0539   WBC 10*3/mm3 10.93*   HEMOGLOBIN g/dL 10.0*   HEMATOCRIT % 31.5*   PLATELETS 10*3/mm3 229       Results from last 7 days   Lab Units 10/26/23  0539   GLUCOSE mg/dL 87   CREATININE mg/dL 0.63   SODIUM mmol/L 133*   POTASSIUM mmol/L 3.9   CHLORIDE mmol/L 101   AST (SGOT) U/L 37*   ALT (SGPT) U/L 31            Assessment:    Post-partum/postop Day:  Day of delivery    Plan:   Routine postpartum/postop care  Remove IV  Shower  Sitz baths  PO pain meds  Breast feeding support  DC meds reviewed  Follow up scheduled  PP/PO precautions given  OB Hospitalist will see pt tomorrow.  Any dc Rx for narcotics will be determined by usage during hospitalization.              Electronically signed by Janene Clay DO, 10/27/23, 8:21 AM EDT.

## 2023-10-27 NOTE — L&D DELIVERY NOTE
VAGINAL DELIVERY NOTE          Date: 10/27/2023   Time:  12:23 AM   GA: 40w0d    Infant: male  infant   3380 g (7 lb 7.2 oz)     APGARS: 7  @ 1 minute / 7  @ 5 minutes    Delivery: The patient progressed to complete, complete and pushed for proximately 20 minutes to deliver a viable infant in cephalic presentation weighing the above weight and above Apgars.  There was no nuchal cord.  The mouth and nares were bulb suctioned on the perineum.  The left anterior and right posterior shoulders were easily delivered without traction.  The remainder of the body delivered.  The infant was vigorous.  Delayed cord clamping for 60 seconds.  The cord was then clamped and cut.  Infant was placed on the maternal chest for recovery.  The placenta delivered intact with the assistance of fundal massage and maternal pushing efforts.      On full evaluation of the perineum, vagina, cervix and rectum a small repair was placed.  First-degree vaginal laceration, 3-0 Monocryl in 1 layer.  External anal sphincter was intact.  200mcg of Cytotec was given orally and 200mcg given SL to assist with uterine tone.    Counts were correct.      Estimated Blood Loss: 100 mls    Complications: None        Electronically signed by Janene Caly DO, 10/27/23, 12:37 AM EDT.       [facility]  [address] [place] [address] [place] [address]  Dept: [phone]  Loc: [phone]

## 2023-10-27 NOTE — DISCHARGE INSTRUCTIONS
DR. ACE'S POSTPARTUM DISCHARGE PRECAUTIONS and Answers to FAQs     NO SEX for SIX weeks.     NO TUB BATH or POOL for TWO week(s), shower only.  Sitz baths are fine.     STITCHES (if present):  wash them daily in the shower with soap and water (any type of soap is fine, it does not need to be antibacterial soap).  It is ok to gently put your finger in and around the vaginal area.  Look at your stitches (the ones on the outside) when you get home.  You will then know what is normal and can have a point of reference to compare it to if you start to have concerns.  REDNESS, PUS, increase in PAIN, FEVER or CHILLS are all reasons to be seen our office immediately.  Go to the ER, if it is after hours or a weekend.       VAGINAL BLEEDING:  may continue on and off over the next several weeks after delivery and may increase slightly once you go home.  You should not be bleeding more than 1 large pad soaked every hour or two.  Clots (even the size of a lemon or larger) may be normal as long as the bleeding is not heavy and the clots do not continue.       FEVER or CHILLS or NOT FEELING WELL: call our office.  If the office is closed, you need to be seen in acute care or ER.       CHEST PAIN or SHORTNESS OF BREATH/AIR: you need to GO TO THE NEAREST ER or CALL 911.      SWELLING: can increase over the next 7-10 days and then should slowly improve.  Your legs/ankles should be fairly similar in size.  A red, painful, hot, swollen leg (usually just one side) can be a sign of a blood clot and should be evaluated immediately.  Call our office.  If it is after hours or a weekend, you must be seen IMMEDIATELY IN THE ER.      ELEVATED BLOOD PRESSURE:  you need to contact us if you are having  persistent elevated BP systolic (top number) more than 155 or diastolic (bottom number) more than 95, or a headache (not relieved with rest, hydration or over the counter pain reliever), an increase in your swelling (usually hands and face),  changes in your vision (typically flashing white or black spots) or severe persistent pain in the location of the upper right side of your belly (under your right breast).  Call our office or go to ER if after hours or a weekend.     LACTATION QUESTIONS or CONCERNS?  Call [facility] [phone].     WORK and SCHOOL TIME OFF: depends on your specific delivery type, surrounding circumstances, and your work insurance/school rules.  If you have questions, please call Roselyn Kelly at [phone] (ext. [phone]).  Or email Roselyn at [email or web address].  They will assist in required paperwork for you and/or family members.      Any further QUESTIONS or CONCERNS, please call [facility] HELADIO Moss at [phone].

## 2023-10-27 NOTE — PLAN OF CARE
Problem: Change in Fetal Wellbeing (Labor)  Goal: Stable Fetal Wellbeing  Outcome: Met     Problem: Delayed Labor Progression (Labor)  Goal: Effective Progression to Delivery  Outcome: Met     Problem: Infection (Labor)  Goal: Absence of Infection Signs and Symptoms  Outcome: Met  Intervention: Prevent or Manage Infection  Recent Flowsheet Documentation  Taken 10/27/2023 0300 by Birdie Fatima RN  Infection Prevention:   cohorting utilized   environmental surveillance performed   equipment surfaces disinfected   personal protective equipment utilized   hand hygiene promoted   rest/sleep promoted   single patient room provided   visitors restricted/screened  Taken 10/27/2023 0230 by Birdie Fatima RN  Infection Prevention:   visitors restricted/screened   single patient room provided   rest/sleep promoted   personal protective equipment utilized   hand hygiene promoted   equipment surfaces disinfected   environmental surveillance performed   cohorting utilized  Taken 10/27/2023 0115 by Birdie Fatima RN  Infection Prevention:   cohorting utilized   environmental surveillance performed   equipment surfaces disinfected   hand hygiene promoted   personal protective equipment utilized   rest/sleep promoted   single patient room provided   visitors restricted/screened  Taken 10/27/2023 0035 by Birdie Fatima RN  Infection Prevention:   cohorting utilized   environmental surveillance performed   equipment surfaces disinfected   hand hygiene promoted   personal protective equipment utilized   rest/sleep promoted   single patient room provided   visitors restricted/screened  Taken 10/26/2023 1940 by Birdie Fatima RN  Infection Prevention:   visitors restricted/screened   single patient room provided   rest/sleep promoted   personal protective equipment utilized   hand hygiene promoted   equipment surfaces disinfected   environmental surveillance performed   cohorting utilized     Problem: Labor Pain  (Labor)  Goal: Acceptable Pain Control  Outcome: Met     Problem: Uterine Tachysystole (Labor)  Goal: Normal Uterine Contraction Pattern  Outcome: Met   Goal Outcome Evaluation:

## 2023-10-28 PROCEDURE — 99231 SBSQ HOSP IP/OBS SF/LOW 25: CPT | Performed by: OBSTETRICS & GYNECOLOGY

## 2023-10-28 RX ADMIN — ACETAMINOPHEN 650 MG: 325 TABLET ORAL at 03:56

## 2023-10-28 RX ADMIN — IBUPROFEN 600 MG: 600 TABLET, FILM COATED ORAL at 06:19

## 2023-10-28 RX ADMIN — ACETAMINOPHEN 650 MG: 325 TABLET ORAL at 20:59

## 2023-10-28 RX ADMIN — TRAMADOL HYDROCHLORIDE 50 MG: 50 TABLET, COATED ORAL at 21:35

## 2023-10-28 RX ADMIN — ACETAMINOPHEN 650 MG: 325 TABLET ORAL at 15:06

## 2023-10-28 RX ADMIN — IBUPROFEN 600 MG: 600 TABLET, FILM COATED ORAL at 12:08

## 2023-10-28 RX ADMIN — IBUPROFEN 600 MG: 600 TABLET, FILM COATED ORAL at 17:31

## 2023-10-28 RX ADMIN — DOCUSATE SODIUM 100 MG: 100 CAPSULE, LIQUID FILLED ORAL at 09:08

## 2023-10-28 RX ADMIN — ACETAMINOPHEN 650 MG: 325 TABLET ORAL at 09:08

## 2023-10-28 NOTE — PLAN OF CARE
Problem: Adult Inpatient Plan of Care  Goal: Plan of Care Review  Outcome: Ongoing, Progressing  Goal: Patient-Specific Goal (Individualized)  Outcome: Ongoing, Progressing  Goal: Absence of Hospital-Acquired Illness or Injury  Outcome: Ongoing, Progressing  Intervention: Identify and Manage Fall Risk  Recent Flowsheet Documentation  Taken 10/28/2023 0400 by Alyson Amaro RN  Safety Promotion/Fall Prevention: safety round/check completed  Taken 10/28/2023 0300 by Alyson Amaro RN  Safety Promotion/Fall Prevention: safety round/check completed  Taken 10/28/2023 0200 by Alyson Amaro RN  Safety Promotion/Fall Prevention: safety round/check completed  Taken 10/28/2023 0000 by Alyson Amaro RN  Safety Promotion/Fall Prevention: safety round/check completed  Taken 10/27/2023 2300 by Alyson Amaro RN  Safety Promotion/Fall Prevention: safety round/check completed  Taken 10/27/2023 2200 by Alyson Amaro RN  Safety Promotion/Fall Prevention: safety round/check completed  Taken 10/27/2023 2100 by Alyson Amaro RN  Safety Promotion/Fall Prevention: safety round/check completed  Taken 10/27/2023 2000 by Alyson Amaro RN  Safety Promotion/Fall Prevention: safety round/check completed  Taken 10/27/2023 1900 by Alyson Amaro RN  Safety Promotion/Fall Prevention: safety round/check completed  Intervention: Prevent and Manage VTE (Venous Thromboembolism) Risk  Recent Flowsheet Documentation  Taken 10/27/2023 2000 by Alyson Amaro RN  Activity Management: up ad hira  VTE Prevention/Management: (frequent ambulation)   patient refused intervention   other (see comments)  Goal: Optimal Comfort and Wellbeing  Outcome: Ongoing, Progressing  Intervention: Provide Person-Centered Care  Recent Flowsheet Documentation  Taken 10/27/2023 2000 by Alyson Amaro RN  Trust Relationship/Rapport:   choices provided   emotional support provided   care explained   empathic listening provided   questions answered   questions encouraged    reassurance provided   thoughts/feelings acknowledged  Goal: Readiness for Transition of Care  Outcome: Ongoing, Progressing   Goal Outcome Evaluation:

## 2023-10-28 NOTE — PROGRESS NOTES
"PostPartum/PostOp PROGRESS NOTE        Subjective:  Patient has no complaints  Pain controlled  Tolerating a regular diet  Passing flatus  Ambulating  Urinating spontaneously  Lochia decreasing, no bleeding concerns  No lightheadedness or dizziness    Objective:  Vitals: Blood pressure 115/73, pulse 78, temperature 98.1 °F (36.7 °C), temperature source Oral, resp. rate 16, height 157.5 cm (62\"), weight 93.9 kg (207 lb), SpO2 97%, currently breastfeeding.          General: NAD, alert and oriented, appropriate  Psych: Normal mood, appropriate  Abdomen: Fundus firm, non tender, Soft, non distended, non tender, normal active bowel sounds, and Fundus at U-2  Extremeties: +1 edema    Labs:  Lab Results (last 24 hours)       ** No results found for the last 24 hours. **              Assessment:    Post-partum/postop Day:  1  Status post     Plan:   Routine postpartum/postop care  Ambulate, Shower, Sitz baths, PO pain meds, Breast feeding support  Baby remains in the NICU but is progressing per the patient.  Plan for reevaluation tomorrow.  I did discuss that tomorrow I would likely be required to discharge the patient.  Discussed the possibility of overnight rooming if the baby is still in the NICU.    Electronically signed by Dusty Mayen MD, 10/28/23, 10:42 AM EDT.  "

## 2023-10-28 NOTE — ANESTHESIA POSTPROCEDURE EVALUATION
Patient: Hortencia Canada    Procedure Summary       Date: 10/26/23 Room / Location:     Anesthesia Start: 0940 Anesthesia Stop: 10/27/23 0023    Procedure: LABOR ANALGESIA Diagnosis:     Scheduled Providers:  Provider: Rajesh Contreras MD    Anesthesia Type: epidural ASA Status: 3            Anesthesia Type: epidural    Vitals  Vitals Value Taken Time   BP 92/52 10/28/23 0645   Temp 36.7 °C (98 °F) 10/28/23 0645   Pulse 63 10/28/23 0645   Resp 15 10/28/23 0645   SpO2 98 % 10/27/23 0225   Vitals shown include unfiled device data.        Post Anesthesia Care and Evaluation    Patient location during evaluation: bedside  Patient participation: complete - patient participated  Level of consciousness: awake  Pain management: adequate    Airway patency: patent  PONV Status: none  Cardiovascular status: acceptable and stable  Respiratory status: acceptable  Hydration status: acceptable  Post Neuraxial Block status: Motor and sensory function returned to baseline and No signs or symptoms of PDPH  Comments: An Anesthesiologist personally participated in the most demanding procedures (including induction and emergence if applicable) in the anesthesia plan, monitored the course of anesthesia administration at frequent intervals and remained physically present and available for immediate diagnosis and treatment of emergencies.

## 2023-10-28 NOTE — PLAN OF CARE
Problem: Adult Inpatient Plan of Care  Goal: Plan of Care Review  Outcome: Ongoing, Progressing  Goal: Patient-Specific Goal (Individualized)  Outcome: Ongoing, Progressing  Goal: Absence of Hospital-Acquired Illness or Injury  Outcome: Ongoing, Progressing  Intervention: Identify and Manage Fall Risk  Goal: Optimal Comfort and Wellbeing  Outcome: Ongoing, Progressing  Goal: Readiness for Transition of Care  Outcome: Ongoing, Progressing     Problem: Bleeding (Labor)  Goal: Hemostasis  Outcome: Ongoing, Progressing     Problem: Skin Injury Risk Increased  Goal: Skin Health and Integrity  Outcome: Ongoing, Progressing     Problem: Adjustment to Role Transition (Postpartum Vaginal Delivery)  Goal: Successful Maternal Role Transition  Outcome: Ongoing, Progressing     Problem: Bleeding (Postpartum Vaginal Delivery)  Goal: Hemostasis  Outcome: Ongoing, Progressing     Problem: Infection (Postpartum Vaginal Delivery)  Goal: Absence of Infection Signs/Symptoms  Outcome: Ongoing, Progressing     Problem: Pain (Postpartum Vaginal Delivery)  Goal: Acceptable Pain Control  Outcome: Ongoing, Progressing     Problem: Urinary Retention (Postpartum Vaginal Delivery)  Goal: Effective Urinary Elimination  Outcome: Ongoing, Progressing   Goal Outcome Evaluation:         Progressing satisfactorily. Infant in nicu.

## 2023-10-29 VITALS
HEART RATE: 79 BPM | TEMPERATURE: 97.9 F | BODY MASS INDEX: 38.09 KG/M2 | RESPIRATION RATE: 18 BRPM | HEIGHT: 62 IN | DIASTOLIC BLOOD PRESSURE: 78 MMHG | OXYGEN SATURATION: 97 % | SYSTOLIC BLOOD PRESSURE: 124 MMHG | WEIGHT: 207 LBS

## 2023-10-29 RX ORDER — ACETAMINOPHEN 500 MG
1000 TABLET ORAL EVERY 6 HOURS PRN
Qty: 50 TABLET | Refills: 0 | Status: SHIPPED | OUTPATIENT
Start: 2023-10-29

## 2023-10-29 RX ORDER — IBUPROFEN 800 MG/1
800 TABLET ORAL EVERY 8 HOURS PRN
Qty: 50 TABLET | Refills: 0 | Status: SHIPPED | OUTPATIENT
Start: 2023-10-29

## 2023-10-29 RX ADMIN — WITCH HAZEL: 500 SOLUTION RECTAL; TOPICAL at 11:31

## 2023-10-29 RX ADMIN — ACETAMINOPHEN 650 MG: 325 TABLET ORAL at 08:38

## 2023-10-29 RX ADMIN — IBUPROFEN 600 MG: 600 TABLET, FILM COATED ORAL at 05:43

## 2023-10-29 RX ADMIN — ACETAMINOPHEN 650 MG: 325 TABLET ORAL at 04:07

## 2023-10-29 RX ADMIN — IBUPROFEN 600 MG: 600 TABLET, FILM COATED ORAL at 11:31

## 2023-10-29 RX ADMIN — IBUPROFEN 600 MG: 600 TABLET, FILM COATED ORAL at 00:52

## 2023-10-29 RX ADMIN — DOCUSATE SODIUM 100 MG: 100 CAPSULE, LIQUID FILLED ORAL at 08:38

## 2023-10-29 NOTE — PROGRESS NOTES
[facility]  Vaginal Delivery Progress Note    Subjective   Postpartum Day 2: Vaginal Delivery    The patient feels well.  Her pain is moderately controlled with nonsteroidal anti-inflammatory drugs and Tylenol.  She is having more hemorrhoid pain and uterine cramping.  She is ambulating well.  Patient describes her bleeding as moderate lochia.    Feeding:    breast and bottle feeding          Objective     Vital Signs Range for the last 24 hours  Temperature: Temp:  [97.9 °F (36.6 °C)-98.4 °F (36.9 °C)] 97.9 °F (36.6 °C)   Temp Source: Temp src: Axillary   BP: BP: ()/(48-69) 96/48   Pulse: Heart Rate:  [70-78] 70   Respirations: Resp:  [18-20] 18       Physical Exam:  General:  no acute distress.  Abdomen: abdomen is soft without significant tenderness, masses, organomegaly or guarding.   Fundus: appropriate, firm, non tender, small lochia   Extremities: normal, atraumatic, no cyanosis, and no edema.     Rubella:   Rubella Antibodies, IgG   Date Value Ref Range Status   06/15/2023 1.86 Immune >0.99 index Final     Comment:                                     Non-immune       <0.90                                  Equivocal  0.90 - 0.99                                  Immune           >0.99     Rh Status:    RH type   Date Value Ref Range Status   10/27/2023 Negative  Final     Immunizations:   Immunization History   Administered Date(s) Administered    COVID-19 (PFIZER) Purple Cap Monovalent 2021, 2021    COVID-19 F23 (PFIZER) 12YRS+ (COMIRNATY) 10/11/2023    Rho (D) Immune Globulin 2023, 10/27/2023       Assessment & Plan       Encounter for induction of labor     (normal spontaneous vaginal delivery)    Perineal laceration, first degree      Hortencia Ray is Day 2  post-partum  Vaginal, Spontaneous   .      Plan:  Continue current care. Discharge today. May room in since baby still in NICU.      Electronically signed by Isi Contreras MD, 10/29/23, 9:19 AM EDT.

## 2023-11-04 PROBLEM — O09.30 LATE PRENATAL CARE AFFECTING PREGNANCY, ANTEPARTUM: Status: RESOLVED | Noted: 2023-06-15 | Resolved: 2023-11-04

## 2023-11-06 ENCOUNTER — TELEPHONE (OUTPATIENT)
Dept: LACTATION | Facility: HOSPITAL | Age: 33
End: 2023-11-06
Payer: MEDICAID

## 2023-11-06 NOTE — TELEPHONE ENCOUNTER
MARYBETH called to check with this patient and her breastfeeding progress. Patient did not answer the phone so  provided the lactation dept number and encouraged to call for any lactation needs.

## 2023-12-01 ENCOUNTER — PATIENT OUTREACH (OUTPATIENT)
Dept: LABOR AND DELIVERY | Facility: HOSPITAL | Age: 33
End: 2023-12-01
Payer: MEDICAID

## 2023-12-01 NOTE — OUTREACH NOTE
[facility]  Unable to Reach       Questions/Answers      Flowsheet Row Responses   Pending Outreach Postpartum Check-in   Call Attempt First   Outcome No answer/busy, Left message   Next Call Attempt Date 12/04/23              Rosalind Hensley RN  Maternity Nurse Navigator    12/1/2023, 13:45 EST

## 2023-12-04 ENCOUNTER — PATIENT OUTREACH (OUTPATIENT)
Dept: LABOR AND DELIVERY | Facility: HOSPITAL | Age: 33
End: 2023-12-04
Payer: MEDICAID

## 2023-12-04 NOTE — OUTREACH NOTE
[facility]  Unable to Reach       Questions/Answers      Flowsheet Row Responses   Pending Outreach Postpartum Check-in   Call Attempt Second   Outcome No answer/busy, Left message, [place]t message sent to patient              Rosalind Hensley RN  Maternity Nurse Navigator    12/4/2023, 13:42 EST

## 2023-12-11 ENCOUNTER — PATIENT OUTREACH (OUTPATIENT)
Dept: CALL CENTER | Facility: HOSPITAL | Age: 33
End: 2023-12-11
Payer: MEDICAID

## 2023-12-11 NOTE — OUTREACH NOTE
[facility] Connection Survey      Flowsheet Row Responses   [facility]   [facility] successful? Yes   Call start time 1530   Call end time 1535   Baby sex Boy   [place] discharged home with mother? Yes   Baby sex Boy   Delivery type Vaginal   Emotional state Acceptance   Family support Yes   Do you have all necessary resources to care for you and your baby?  Yes   Have members of your household adjusted to your baby? Yes   Did you have any problems with pre-eclampsia during this pregnancy? No   Did you have blood glucose issues during this pregnancy No   Lochia amount None   Did you have an episiotomy/tear/abdominal incision? Yes   Feeding Method Combination   Frequency q 1-3 hrs   Duration 2 oz   Pumping Yes   Supplementing Formula, Breast Milk   Frequency takes more formula than expressed breast milk   Breast Condition No   Nipple Condition No   Number of wet diapers x 24 hours 6   Last BM x 24 hours 1-3   Umbilical Cord No reported signs or symptoms   Umbilical cord comments cord off   Was the baby circumcised? Yes   Circumcision care and signs/symptoms to report Reviewed   Circumcision comments healed   Where does the baby usually sleep? Bassinet   Are there stuffed animals, toys, pillows, quilts, blankets, wedges, positioners, bumpers or other loose bedding in the infant's sleeping environment? No   Does the baby ever share a sleep surface in a bed, couch, recliner or other? No   What position do you lay your baby down to sleep? Back   Are you and/or other caregivers smoking inside or outside the baby's home? No   Mom appointment comments: pp f/u scheduled   Baby appointment comments: peds visits x2, gaining wt   Call completed? Yes   How satisfied were you with the Motherhood Connection Program? 5              Ana Maria MCKEON - Registered Nurse

## 2023-12-15 NOTE — PROGRESS NOTES
"POSTPARTUM Follow Up Visit      Chief Complaint   Patient presents with    Postpartum Care     HPI:      Date of delivery: 10/27/2023  Delivery type:            Perineum : 2nd degree laceration  Delivering Provider:   Dr. Janene Clay      Feeding: Bottle  Pain:  no  Vaginal Bleeding:  no  Depressed/Anxious:  no  EPDS score: 5   #10: 0  Plans for BC:  Desires to discuss options  Weight at last OB OV:  207lbs   Last pap date and result: past due  Discharge Summary by Isi Contreras MD (10/26/2023 18:44)    PHYSICAL EXAM:  /80   Pulse 73   Ht 157.5 cm (62.01\")   Wt 89.8 kg (198 lb)   Breastfeeding No   BMI 36.21 kg/m²  Not found.  General- NAD, alert and oriented, appropriate  Psych- Normal mood, good memory, good eye contact    External genitalia- Declines    ASSESSMENT AND PLAN:  Diagnoses and all orders for this visit:    1. Postpartum follow-up (Primary)    2. Birth control counseling  -     norethindrone-ethinyl estradiol FE (JUNEL FE 1/20) 1-20 MG-MCG per tablet; Take 1 tablet by mouth Daily.  Dispense: 28 tablet; Refill: 5    3. Hepatitis C virus infection without hepatic coma, unspecified chronicity  Overview:  2023 LFTs within normal limits and Hep C quant, elevated.    Recommendation for treatment with gastroenterology postpartum    Orders:  -     Ambulatory Referral to Gastroenterology      Counseling:    All birth control options reviewed in detail.  R/B/A/SE/E of each wrt pts PMHx and prior BC use  ZEB risk w hormonal BIRTH CONTROL reviewed (estrogen containing only), S/Sx to watch for discussed and questions answered.  Newer studies indicate possible increased breast cancer reviewed (both estrogen and progestin only).  May resume intercourse  May resume normal activities  Core strengthening exercises reviewed and recommended  Kegel exercises reviewed and recommended  Use backup contraception for 4 weeks after initiating chosen BC        Follow Up:  Return in about 3 months (around " 3/28/2024) for WWE.          Janene Clay,   12/28/2023    [facility]  [address] [place] [address]  Dept: [phone]  Dept Fax: [phone]  Loc: [phone]  Loc Fax: [phone]

## 2023-12-27 PROBLEM — O26.899 RH NEGATIVE, ANTEPARTUM: Status: RESOLVED | Noted: 2023-07-18 | Resolved: 2023-12-27

## 2023-12-27 PROBLEM — Z34.90 ENCOUNTER FOR INDUCTION OF LABOR: Status: RESOLVED | Noted: 2023-10-26 | Resolved: 2023-12-27

## 2023-12-27 PROBLEM — F12.10 MILD TETRAHYDROCANNABINOL (THC) ABUSE: Status: RESOLVED | Noted: 2023-09-01 | Resolved: 2023-12-27

## 2023-12-27 PROBLEM — Z34.80 SUPERVISION OF OTHER NORMAL PREGNANCY, ANTEPARTUM: Status: RESOLVED | Noted: 2023-06-15 | Resolved: 2023-12-27

## 2023-12-27 PROBLEM — Z67.91 RH NEGATIVE, ANTEPARTUM: Status: RESOLVED | Noted: 2023-07-18 | Resolved: 2023-12-27

## 2023-12-28 ENCOUNTER — POSTPARTUM VISIT (OUTPATIENT)
Dept: OBSTETRICS AND GYNECOLOGY | Facility: CLINIC | Age: 33
End: 2023-12-28
Payer: MEDICAID

## 2023-12-28 VITALS
BODY MASS INDEX: 36.44 KG/M2 | DIASTOLIC BLOOD PRESSURE: 80 MMHG | SYSTOLIC BLOOD PRESSURE: 123 MMHG | HEIGHT: 62 IN | HEART RATE: 73 BPM | WEIGHT: 198 LBS

## 2023-12-28 DIAGNOSIS — B19.20 HEPATITIS C VIRUS INFECTION WITHOUT HEPATIC COMA, UNSPECIFIED CHRONICITY: ICD-10-CM

## 2023-12-28 DIAGNOSIS — Z30.09 BIRTH CONTROL COUNSELING: ICD-10-CM

## 2023-12-28 RX ORDER — NORETHINDRONE ACETATE AND ETHINYL ESTRADIOL 1MG-20(21)
1 KIT ORAL DAILY
Qty: 28 TABLET | Refills: 5 | Status: SHIPPED | OUTPATIENT
Start: 2023-12-28

## 2024-01-04 ENCOUNTER — TELEPHONE (OUTPATIENT)
Dept: GASTROENTEROLOGY | Facility: CLINIC | Age: 34
End: 2024-01-04
Payer: MEDICAID

## 2024-01-04 NOTE — TELEPHONE ENCOUNTER
Left voice message for patient to return call in regards to a referral we received from Janene Clay for Hep C.

## 2024-05-02 ENCOUNTER — TELEPHONE (OUTPATIENT)
Dept: GASTROENTEROLOGY | Facility: HOSPITAL | Age: 34
End: 2024-05-02
Payer: MEDICAID

## 2024-05-02 NOTE — TELEPHONE ENCOUNTER
Left voice message to remind patient of her appointment on 5/3@11:00 at [facility]. Nothing to eat 3 hours prior to appointment. Bring insurance and a photo ID.

## 2024-05-03 ENCOUNTER — OFFICE VISIT (OUTPATIENT)
Dept: GASTROENTEROLOGY | Facility: HOSPITAL | Age: 34
End: 2024-05-03
Payer: MEDICAID

## 2024-05-03 ENCOUNTER — LAB (OUTPATIENT)
Dept: LAB | Facility: HOSPITAL | Age: 34
End: 2024-05-03
Payer: MEDICAID

## 2024-05-03 VITALS
HEIGHT: 62 IN | DIASTOLIC BLOOD PRESSURE: 80 MMHG | HEART RATE: 95 BPM | WEIGHT: 198.5 LBS | SYSTOLIC BLOOD PRESSURE: 143 MMHG | BODY MASS INDEX: 36.53 KG/M2

## 2024-05-03 DIAGNOSIS — B18.2 CHRONIC HEPATITIS C WITHOUT HEPATIC COMA: Primary | ICD-10-CM

## 2024-05-03 LAB
ALBUMIN SERPL-MCNC: 4.8 G/DL (ref 3.5–5.2)
ALBUMIN/GLOB SERPL: 1.5 G/DL
ALP SERPL-CCNC: 65 U/L (ref 39–117)
ALT SERPL W P-5'-P-CCNC: 17 U/L (ref 1–33)
ANION GAP SERPL CALCULATED.3IONS-SCNC: 10 MMOL/L (ref 5–15)
AST SERPL-CCNC: 19 U/L (ref 1–32)
BASOPHILS # BLD AUTO: 0.07 10*3/MM3 (ref 0–0.2)
BASOPHILS NFR BLD AUTO: 0.9 % (ref 0–1.5)
BILIRUB SERPL-MCNC: 0.3 MG/DL (ref 0–1.2)
BUN SERPL-MCNC: 8 MG/DL (ref 6–20)
BUN/CREAT SERPL: 8.4 (ref 7–25)
CALCIUM SPEC-SCNC: 9.5 MG/DL (ref 8.6–10.5)
CHLORIDE SERPL-SCNC: 104 MMOL/L (ref 98–107)
CO2 SERPL-SCNC: 25 MMOL/L (ref 22–29)
CREAT SERPL-MCNC: 0.95 MG/DL (ref 0.57–1)
DEPRECATED RDW RBC AUTO: 49.3 FL (ref 37–54)
EGFRCR SERPLBLD CKD-EPI 2021: 82.3 ML/MIN/1.73
EOSINOPHIL # BLD AUTO: 0.07 10*3/MM3 (ref 0–0.4)
EOSINOPHIL NFR BLD AUTO: 0.9 % (ref 0.3–6.2)
ERYTHROCYTE [DISTWIDTH] IN BLOOD BY AUTOMATED COUNT: 16.1 % (ref 12.3–15.4)
GLOBULIN UR ELPH-MCNC: 3.3 GM/DL
GLUCOSE SERPL-MCNC: 90 MG/DL (ref 65–99)
HCT VFR BLD AUTO: 43 % (ref 34–46.6)
HGB BLD-MCNC: 13.6 G/DL (ref 12–15.9)
IMM GRANULOCYTES # BLD AUTO: 0.03 10*3/MM3 (ref 0–0.05)
IMM GRANULOCYTES NFR BLD AUTO: 0.4 % (ref 0–0.5)
LYMPHOCYTES # BLD AUTO: 2.65 10*3/MM3 (ref 0.7–3.1)
LYMPHOCYTES NFR BLD AUTO: 35.3 % (ref 19.6–45.3)
MCH RBC QN AUTO: 27 PG (ref 26.6–33)
MCHC RBC AUTO-ENTMCNC: 31.6 G/DL (ref 31.5–35.7)
MCV RBC AUTO: 85.3 FL (ref 79–97)
MONOCYTES # BLD AUTO: 0.45 10*3/MM3 (ref 0.1–0.9)
MONOCYTES NFR BLD AUTO: 6 % (ref 5–12)
NEUTROPHILS NFR BLD AUTO: 4.23 10*3/MM3 (ref 1.7–7)
NEUTROPHILS NFR BLD AUTO: 56.5 % (ref 42.7–76)
NRBC BLD AUTO-RTO: 0 /100 WBC (ref 0–0.2)
PLATELET # BLD AUTO: 234 10*3/MM3 (ref 140–450)
PMV BLD AUTO: 11 FL (ref 6–12)
POTASSIUM SERPL-SCNC: 4.6 MMOL/L (ref 3.5–5.2)
PROT SERPL-MCNC: 8.1 G/DL (ref 6–8.5)
RBC # BLD AUTO: 5.04 10*6/MM3 (ref 3.77–5.28)
SODIUM SERPL-SCNC: 139 MMOL/L (ref 136–145)
WBC NRBC COR # BLD AUTO: 7.5 10*3/MM3 (ref 3.4–10.8)

## 2024-05-03 PROCEDURE — 80053 COMPREHEN METABOLIC PANEL: CPT | Performed by: NURSE PRACTITIONER

## 2024-05-03 PROCEDURE — 86704 HEP B CORE ANTIBODY TOTAL: CPT | Performed by: NURSE PRACTITIONER

## 2024-05-03 PROCEDURE — 91200 LIVER ELASTOGRAPHY: CPT | Performed by: NURSE PRACTITIONER

## 2024-05-03 PROCEDURE — 87902 NFCT AGT GNTYP ALYS HEP C: CPT | Performed by: NURSE PRACTITIONER

## 2024-05-03 PROCEDURE — 87522 HEPATITIS C REVRS TRNSCRPJ: CPT | Performed by: NURSE PRACTITIONER

## 2024-05-03 PROCEDURE — 36415 COLL VENOUS BLD VENIPUNCTURE: CPT | Performed by: NURSE PRACTITIONER

## 2024-05-03 PROCEDURE — G0463 HOSPITAL OUTPT CLINIC VISIT: HCPCS | Performed by: NURSE PRACTITIONER

## 2024-05-03 PROCEDURE — 85025 COMPLETE CBC W/AUTO DIFF WBC: CPT | Performed by: NURSE PRACTITIONER

## 2024-05-03 RX ORDER — CETIRIZINE HYDROCHLORIDE 5 MG/1
5 TABLET ORAL DAILY
COMMUNITY

## 2024-05-03 NOTE — PROGRESS NOTES
Chief Complaint      Chief Complaint  Hepatitis C    Subjective            History of Present Illness     Hortencia Canada presents to [facility] for evaluation and treatment of chronic HCV.      She reports being dx with HCV in 2015.  Denies previous treatment.  Reports a history of illicit drug use.  States that she's been clean since last year.  Since initial referral she has moved to [place] and wishes to complete f/u appointments via telehealth.      Past Medical History     No Known Allergies    Current Outpatient Medications:     cetirizine (zyrTEC) 5 MG tablet, Take 1 tablet by mouth Daily., Disp: , Rfl:   Past Medical History:   Diagnosis Date    Hepatitis C     Mild tetrahydrocannabinol (THC) abuse     9/1/2023 + and in June + on NOB labs     Rh negative, antepartum      Past Surgical History:   Procedure Laterality Date    TONSILLECTOMY       Social History     Socioeconomic History    Marital status: Single    Highest education level: High school graduate   Tobacco Use    Smoking status: Former     Current packs/day: 0.50     Types: Cigarettes    Tobacco comments:     Quit smoking around 2017   Vaping Use    Vaping status: Never Used   Substance and Sexual Activity    Alcohol use: No    Drug use: Yes     Types: Marijuana     Comment: past use THC, DELTA 8    Sexual activity: Yes     Partners: Male     Birth control/protection: None       Objective     Objective     Vitals:    05/03/24 1102   BP: 143/80   Pulse: 95     Body mass index is 36.3 kg/m².  Body surface area is 1.91 meters squared.    Physical Exam    Results       Result Review :     The following data was reviewed by: KARISSA Miranda on 05/03/2024:    [place] Stiffness Consistent with: F0/F1  CAP Score: S0    CMP:  Lab Results   Component Value Date    BUN 6 10/26/2023    CREATININE 0.63 10/26/2023     (L) 10/26/2023    K 3.9 10/26/2023     10/26/2023    CALCIUM 9.1 10/26/2023    ALBUMIN  2.9 (L) 10/26/2023    BILITOT 0.4 10/26/2023    ALKPHOS 130 (H) 10/26/2023    AST 37 (H) 10/26/2023    ALT 31 10/26/2023     CBC w/ diff:   Lab Results   Component Value Date    WBC 10.93 (H) 10/26/2023    RBC 3.86 10/26/2023    HGB 10.0 (L) 10/26/2023    HCT 31.5 (L) 10/26/2023    MCV 81.6 10/26/2023    MCH 25.9 (L) 10/26/2023    MCHC 31.7 10/26/2023    RDW 14.9 10/26/2023     10/26/2023    NEUTRORELPCT 79.1 (H) 06/15/2023    AUTOIGPER 0.6 (H) 06/15/2023    LYMPHORELPCT 14.9 (L) 06/15/2023    MONORELPCT 4.5 (L) 06/15/2023    EOSRELPCT 0.7 06/15/2023    BASORELPCT 0.2 06/15/2023     HCV Genotype, HIV   Lab Results   Component Value Date    STO1KXJ9 Non-Reactive 06/15/2023     Acute Hepatitis Panel   Lab Results   Component Value Date    HEPBSAG Non-Reactive 06/15/2023    HEPCVIRUSABY Reactive (A) 06/15/2023      Lab Results   Component Value Date    HEPATITISC [id number] 09/01/2023    MSAPMP97 6.358 09/01/2023          Liver Elastography    Performed by: Tracy Esteban APRN  Authorized by: Tracy Esteban APRN  Ordering Provider: Tracy Esteban APRN    Probe:  M+  Procedure Details:  Procedure: After providing an oral and written explanation of the Fibroscan vibration controlled transient elastography (VTCE) test procedure to the patient. The patient was placed in supine position with right arm in maximum abduction to allow optimal exposure of right lateral abdomen. Patient was briefly assessed, identifying terminus of the xyphoid process and locating an ideal transient elastography testing site, midline and lateral to this point. Patient was instructed to breathe normally and remain stationary during the test process. Pre-measurement data confirmed the transient elastography probe was centered over the liver parenchyma. A series of ten 50 Hz mechanical pulses were applied with controlled application pressure to induce a mechanical shear wave in the liver tissue. For each measurement, the  shear wave propagation speed was detected, displayed and converted to its equivalent liver stiffness value in kilopascals. Skin to liver capsules distance and shear wave characteristics were monitored during the entire examination to assure quality data. Median liver stiffness measurement and interquartile range were calculated and displayed in real time. Acquired measurement data was stored and submitted to the provider for review and interpretation. Patient tolerated the procedure well and was discharged without incident.   Clinical Information:     NPO 3 Hours or More: Yes      Actively Drinking: No    Findings:     Median Liver Stiffness Score:  4.3    Interquartile Range (IQR) to Median Ratio:  17    Interpretation:  Taking into account the patient's history and recent liver test, this Liver Stiffness Score is consistent with below scale:    [place] Stiffness Consistent with:  F0-F1    Current Scan Considered Reliab: Yes      Median Controlled Attenuation Parameter (dB/m):  224    IQR:  6    Liver Fat Interpretation:  Taking into account the patient's history, this CAP score is consistent with below scale:      CAP SCORE:  Normal/mild liver fat       Assessment and Plan            Assessment:    Diagnoses and all orders for this visit:    1. Chronic hepatitis C without hepatic coma (Primary)  -     Liver Elastography  -     Hepatitis B Core Antibody, Total  -     Hepatitis C Genotype  -     Hepatitis C RNA, Quantitative, PCR (graph)  -     Comprehensive Metabolic Panel  -     CBC Auto Differential  -     US Abdomen Limited; Future         Plan:   Labs today to determine genotype and treatment plan.      Patient Instructions: Avoid Alcohol, Avoid Illicit Drug Use, Importance of keeping appointments.  Patient Education Provided: Yes    Follow Up     Follow Up   Return in about 4 weeks (around 5/31/2024) for Hepatitis C.  Patient was given instructions and counseling regarding her condition or for health maintenance  advice. Please see specific information pulled into the AVS if appropriate.     Tracy Esteban, APRN  05/03/2024

## 2024-05-04 LAB — HBV CORE AB SERPL QL IA: NEGATIVE

## 2024-05-06 LAB
HCV RNA SERPL NAA+PROBE-ACNC: NORMAL IU/ML
TEST INFORMATION: NORMAL

## 2024-05-07 ENCOUNTER — TELEPHONE (OUTPATIENT)
Dept: GASTROENTEROLOGY | Facility: CLINIC | Age: 34
End: 2024-05-07
Payer: MEDICAID

## 2024-05-07 LAB — HCV GENTYP SERPL NAA+PROBE: NORMAL
